# Patient Record
Sex: FEMALE | Race: WHITE | Employment: FULL TIME | URBAN - METROPOLITAN AREA
[De-identification: names, ages, dates, MRNs, and addresses within clinical notes are randomized per-mention and may not be internally consistent; named-entity substitution may affect disease eponyms.]

---

## 2017-01-25 PROCEDURE — 36415 COLL VENOUS BLD VENIPUNCTURE: CPT | Performed by: OBSTETRICS & GYNECOLOGY

## 2017-01-25 PROCEDURE — 87529 HSV DNA AMP PROBE: CPT | Performed by: OBSTETRICS & GYNECOLOGY

## 2017-01-25 PROCEDURE — 83520 IMMUNOASSAY QUANT NOS NONAB: CPT | Performed by: OBSTETRICS & GYNECOLOGY

## 2017-09-05 ENCOUNTER — APPOINTMENT (OUTPATIENT)
Dept: GENERAL RADIOLOGY | Age: 47
End: 2017-09-05
Attending: NURSE PRACTITIONER
Payer: COMMERCIAL

## 2017-09-05 ENCOUNTER — HOSPITAL ENCOUNTER (OUTPATIENT)
Age: 47
Discharge: HOME | End: 2017-09-05
Payer: COMMERCIAL

## 2017-09-05 VITALS
HEART RATE: 81 BPM | OXYGEN SATURATION: 98 % | SYSTOLIC BLOOD PRESSURE: 153 MMHG | TEMPERATURE: 98 F | BODY MASS INDEX: 34 KG/M2 | WEIGHT: 210 LBS | RESPIRATION RATE: 20 BRPM | DIASTOLIC BLOOD PRESSURE: 102 MMHG

## 2017-09-05 DIAGNOSIS — J40 BRONCHITIS: Primary | ICD-10-CM

## 2017-09-05 LAB — S PYO AG THROAT QL: NEGATIVE

## 2017-09-05 PROCEDURE — 71020 XR CHEST PA + LAT CHEST (CPT=71020): CPT | Performed by: NURSE PRACTITIONER

## 2017-09-05 PROCEDURE — 99214 OFFICE O/P EST MOD 30 MIN: CPT

## 2017-09-05 PROCEDURE — 94640 AIRWAY INHALATION TREATMENT: CPT

## 2017-09-05 PROCEDURE — 87430 STREP A AG IA: CPT

## 2017-09-05 RX ORDER — ALBUTEROL SULFATE 90 UG/1
2 AEROSOL, METERED RESPIRATORY (INHALATION) EVERY 4 HOURS PRN
Qty: 1 INHALER | Refills: 0 | Status: SHIPPED | OUTPATIENT
Start: 2017-09-05 | End: 2017-10-05

## 2017-09-05 RX ORDER — IPRATROPIUM BROMIDE AND ALBUTEROL SULFATE 2.5; .5 MG/3ML; MG/3ML
3 SOLUTION RESPIRATORY (INHALATION) ONCE
Status: COMPLETED | OUTPATIENT
Start: 2017-09-05 | End: 2017-09-05

## 2017-09-05 RX ORDER — PREDNISONE 20 MG/1
40 TABLET ORAL DAILY
Qty: 10 TABLET | Refills: 0 | Status: SHIPPED | OUTPATIENT
Start: 2017-09-05 | End: 2017-09-10

## 2017-09-05 RX ORDER — AZITHROMYCIN 250 MG/1
TABLET, FILM COATED ORAL
Qty: 1 PACKAGE | Refills: 0 | Status: SHIPPED | OUTPATIENT
Start: 2017-09-05 | End: 2017-09-10

## 2017-09-05 NOTE — ED NOTES
Lungs clear discharge and follow up inst reviewed stop smoking fluids rest wash hands call pcp go to the ed for new or worse concerns

## 2017-09-05 NOTE — ED PROVIDER NOTES
Patient presents with:  Cough/URI      HPI:     Alli Sarmiento is a 40year old female who presents for evaluation and management of a chief complaint of cough, sore throat, nasal congestion over the course of the last week.   Patient has been taking DayQ persistent    MDM/Assessment/Plan:   Orders for this encounter:    Xray, duoneb, rapid strep and re-evaluate. Rapid strep reviewed, negative. Xray reviewed, negative chest.     Reports improvement since receiving DuoNeb.   Lungs clear bilaterally, e Refill:  0      Albuterol Sulfate  (90 Base) MCG/ACT Inhalation Aero Soln          Sig: Inhale 2 puffs into the lungs every 4 (four) hours as needed for Wheezing.           Dispense:  1 Inhaler          Refill:  0      azithromycin (Ellouise Ohm)

## 2017-11-07 ENCOUNTER — HOSPITAL ENCOUNTER (OUTPATIENT)
Dept: MAMMOGRAPHY | Age: 47
Discharge: HOME | End: 2017-11-07
Attending: FAMILY MEDICINE
Payer: COMMERCIAL

## 2017-11-07 DIAGNOSIS — Z12.31 ENCOUNTER FOR SCREENING MAMMOGRAM FOR MALIGNANT NEOPLASM OF BREAST: ICD-10-CM

## 2017-11-07 PROCEDURE — 77067 SCR MAMMO BI INCL CAD: CPT | Performed by: FAMILY MEDICINE

## 2018-07-13 ENCOUNTER — HOSPITAL (OUTPATIENT)
Dept: OTHER | Age: 48
End: 2018-07-13
Attending: OBSTETRICS & GYNECOLOGY

## 2018-07-13 LAB
ALBUMIN SERPL-MCNC: 3.5 GM/DL (ref 3.6–5.1)
ALBUMIN/GLOB SERPL: 1 {RATIO} (ref 1–2.4)
ALP SERPL-CCNC: 60 UNIT/L (ref 45–117)
ALT SERPL-CCNC: 19 UNIT/L
ANALYZER ANC (IANC): ABNORMAL
ANION GAP SERPL CALC-SCNC: 12 MMOL/L (ref 10–20)
AST SERPL-CCNC: 14 UNIT/L
BASOPHILS # BLD: 0.1 THOUSAND/MCL (ref 0–0.3)
BASOPHILS NFR BLD: 1 %
BILIRUB SERPL-MCNC: 0.3 MG/DL (ref 0.2–1)
BUN SERPL-MCNC: 13 MG/DL (ref 6–20)
BUN/CREAT SERPL: 16 (ref 7–25)
CALCIUM SERPL-MCNC: 8.2 MG/DL (ref 8.4–10.2)
CHLORIDE: 106 MMOL/L (ref 98–107)
CO2 SERPL-SCNC: 23 MMOL/L (ref 21–32)
CREAT SERPL-MCNC: 0.81 MG/DL (ref 0.51–0.95)
DIFFERENTIAL METHOD BLD: ABNORMAL
EOSINOPHIL # BLD: 0.3 THOUSAND/MCL (ref 0.1–0.5)
EOSINOPHIL NFR BLD: 3 %
ERYTHROCYTE [DISTWIDTH] IN BLOOD: 16.8 % (ref 11–15)
GLOBULIN SER-MCNC: 3.5 GM/DL (ref 2–4)
GLUCOSE SERPL-MCNC: 84 MG/DL (ref 65–99)
HBV SURFACE AG SER QL: NEGATIVE
HCG SERPL-ACNC: <2 MUNIT/ML
HCV AB SERPL QL IA: NEGATIVE
HEMATOCRIT: 34.3 % (ref 36–46.5)
HGB BLD-MCNC: 10.5 GM/DL (ref 12–15.5)
HIV ANTIGEN/ANTIBODY SCREEN: NONREACTIVE
LYMPHOCYTES # BLD: 1.8 THOUSAND/MCL (ref 1–4.8)
LYMPHOCYTES NFR BLD: 23 %
MCH RBC QN AUTO: 23.1 PG (ref 26–34)
MCHC RBC AUTO-ENTMCNC: 30.6 GM/DL (ref 32–36.5)
MCV RBC AUTO: 75.6 FL (ref 78–100)
MONOCYTES # BLD: 0.6 THOUSAND/MCL (ref 0.3–0.9)
MONOCYTES NFR BLD: 7 %
NEUTROPHILS # BLD: 5.3 THOUSAND/MCL (ref 1.8–7.7)
NEUTROPHILS NFR BLD: 66 %
NEUTS SEG NFR BLD: ABNORMAL %
NRBC (NRBCRE): ABNORMAL
PLATELET # BLD: 303 THOUSAND/MCL (ref 140–450)
POTASSIUM SERPL-SCNC: 4.1 MMOL/L (ref 3.4–5.1)
PROT SERPL-MCNC: 7 GM/DL (ref 6.4–8.2)
RBC # BLD: 4.54 MILLION/MCL (ref 4–5.2)
SODIUM SERPL-SCNC: 137 MMOL/L (ref 135–145)
WBC # BLD: 8 THOUSAND/MCL (ref 4.2–11)

## 2018-07-19 ENCOUNTER — HOSPITAL (OUTPATIENT)
Dept: OTHER | Age: 48
End: 2018-07-19
Attending: OBSTETRICS & GYNECOLOGY

## 2018-07-19 ENCOUNTER — CHARTING TRANS (OUTPATIENT)
Dept: OTHER | Age: 48
End: 2018-07-19

## 2020-11-18 ENCOUNTER — LAB ENCOUNTER (OUTPATIENT)
Dept: LAB | Age: 50
End: 2020-11-18
Attending: FAMILY MEDICINE
Payer: COMMERCIAL

## 2020-11-18 DIAGNOSIS — E78.5 HYPERLIPEMIA: Primary | ICD-10-CM

## 2020-11-18 DIAGNOSIS — E03.9 MYXEDEMA HEART DISEASE: ICD-10-CM

## 2020-11-18 DIAGNOSIS — I51.9 MYXEDEMA HEART DISEASE: ICD-10-CM

## 2020-11-18 DIAGNOSIS — I10 ESSENTIAL HYPERTENSION, MALIGNANT: ICD-10-CM

## 2020-11-18 PROCEDURE — 80061 LIPID PANEL: CPT

## 2020-11-18 PROCEDURE — 36415 COLL VENOUS BLD VENIPUNCTURE: CPT

## 2020-11-18 PROCEDURE — 84443 ASSAY THYROID STIM HORMONE: CPT

## 2020-11-18 PROCEDURE — 80048 BASIC METABOLIC PNL TOTAL CA: CPT

## 2021-03-04 ENCOUNTER — OFFICE VISIT (OUTPATIENT)
Dept: INTERNAL MEDICINE CLINIC | Facility: CLINIC | Age: 51
End: 2021-03-04
Payer: COMMERCIAL

## 2021-03-04 VITALS
SYSTOLIC BLOOD PRESSURE: 158 MMHG | BODY MASS INDEX: 39.17 KG/M2 | WEIGHT: 238 LBS | DIASTOLIC BLOOD PRESSURE: 98 MMHG | HEIGHT: 65.3 IN | TEMPERATURE: 99 F | HEART RATE: 82 BPM | OXYGEN SATURATION: 98 %

## 2021-03-04 DIAGNOSIS — I10 ESSENTIAL HYPERTENSION: ICD-10-CM

## 2021-03-04 DIAGNOSIS — Z12.31 SCREENING MAMMOGRAM, ENCOUNTER FOR: ICD-10-CM

## 2021-03-04 DIAGNOSIS — Z00.00 PHYSICAL EXAM: Primary | ICD-10-CM

## 2021-03-04 DIAGNOSIS — E03.9 HYPOTHYROIDISM, UNSPECIFIED TYPE: ICD-10-CM

## 2021-03-04 DIAGNOSIS — Z90.710 HISTORY OF HYSTERECTOMY: ICD-10-CM

## 2021-03-04 PROCEDURE — 3080F DIAST BP >= 90 MM HG: CPT | Performed by: INTERNAL MEDICINE

## 2021-03-04 PROCEDURE — 3008F BODY MASS INDEX DOCD: CPT | Performed by: INTERNAL MEDICINE

## 2021-03-04 PROCEDURE — 93000 ELECTROCARDIOGRAM COMPLETE: CPT | Performed by: INTERNAL MEDICINE

## 2021-03-04 PROCEDURE — 3077F SYST BP >= 140 MM HG: CPT | Performed by: INTERNAL MEDICINE

## 2021-03-04 PROCEDURE — 99386 PREV VISIT NEW AGE 40-64: CPT | Performed by: INTERNAL MEDICINE

## 2021-03-04 RX ORDER — CETIRIZINE HYDROCHLORIDE 10 MG/1
10 TABLET ORAL DAILY
COMMUNITY

## 2021-03-04 RX ORDER — LOSARTAN POTASSIUM AND HYDROCHLOROTHIAZIDE 12.5; 1 MG/1; MG/1
TABLET ORAL
COMMUNITY
End: 2021-03-04

## 2021-03-04 RX ORDER — ACYCLOVIR 50 MG/G
OINTMENT TOPICAL
COMMUNITY

## 2021-03-04 RX ORDER — LOSARTAN POTASSIUM AND HYDROCHLOROTHIAZIDE 25; 100 MG/1; MG/1
1 TABLET ORAL DAILY
Qty: 90 TABLET | Refills: 3 | Status: SHIPPED | OUTPATIENT
Start: 2021-03-04 | End: 2022-02-27

## 2021-03-04 RX ORDER — LEVOTHYROXINE SODIUM 0.15 MG/1
150 TABLET ORAL
COMMUNITY
Start: 2020-12-16 | End: 2021-03-04

## 2021-03-04 RX ORDER — LEVOTHYROXINE SODIUM 0.15 MG/1
150 TABLET ORAL
Qty: 90 TABLET | Refills: 1 | Status: SHIPPED | OUTPATIENT
Start: 2021-03-04 | End: 2021-09-05

## 2021-03-04 RX ORDER — FLUTICASONE PROPIONATE 50 MCG
SPRAY, SUSPENSION (ML) NASAL
COMMUNITY
End: 2021-03-04 | Stop reason: ALTCHOICE

## 2021-03-04 RX ORDER — LOSARTAN POTASSIUM AND HYDROCHLOROTHIAZIDE 12.5; 1 MG/1; MG/1
1 TABLET ORAL DAILY
COMMUNITY
Start: 2021-02-10 | End: 2021-03-04 | Stop reason: ALTCHOICE

## 2021-03-04 NOTE — PROGRESS NOTES
HPI:   Santos Rivera is a 53year old female who presents for a complete physical exam.     Patient complains of: Patient presents with:  Establish Care: Previously saw a doctor at [address] [place] [address] in [facility], Dr. Burns [facility], last seen in November.  When Years since quittin.1      Smokeless tobacco: Current User      Tobacco comment: Social smoker    Alcohol use:  Yes      Alcohol/week: 0.0 standard drinks      Frequency: 2-3 times a week      Comment: \"Daily, 2 glasses, Last drink: Last night\"    Yunior obvious wounds, no rashes  Neurological exam: Cranial nerves II through XII intact, no gross deficits  Musculoskeletal exam: no arthritis appreciated, no obvious deformity    ASSESSMENT AND PLAN:   Mac Lamb is a 53year old female who presents for obtaining history, evaluating patient, discussing treatment options, diet, exercise, review of available labs and radiology reports, and completing documentation.     Graeme Roberts,   3/4/2021  4:00 PM

## 2021-03-04 NOTE — PATIENT INSTRUCTIONS
1. Physical exam  Physical exam instruction: Improve diet and exercise, complete fasting labs in the near future and you will be called with results 5-7 days after completed, call with questions.   Call the central scheduling number at [phone] to sched

## 2021-03-12 ENCOUNTER — LAB ENCOUNTER (OUTPATIENT)
Dept: LAB | Age: 51
End: 2021-03-12
Attending: INTERNAL MEDICINE
Payer: COMMERCIAL

## 2021-03-12 DIAGNOSIS — Z00.00 PHYSICAL EXAM: ICD-10-CM

## 2021-03-12 LAB
ALBUMIN SERPL-MCNC: 3.5 G/DL (ref 3.4–5)
ALBUMIN/GLOB SERPL: 0.9 {RATIO} (ref 1–2)
ALP LIVER SERPL-CCNC: 55 U/L
ALT SERPL-CCNC: 22 U/L
ANION GAP SERPL CALC-SCNC: 6 MMOL/L (ref 0–18)
AST SERPL-CCNC: 10 U/L (ref 15–37)
BASOPHILS # BLD AUTO: 0.11 X10(3) UL (ref 0–0.2)
BASOPHILS NFR BLD AUTO: 1.1 %
BILIRUB SERPL-MCNC: 0.6 MG/DL (ref 0.1–2)
BILIRUB UR QL: NEGATIVE
BUN BLD-MCNC: 13 MG/DL (ref 7–18)
BUN/CREAT SERPL: 15.9 (ref 10–20)
CALCIUM BLD-MCNC: 8.8 MG/DL (ref 8.5–10.1)
CHLORIDE SERPL-SCNC: 102 MMOL/L (ref 98–112)
CHOLEST SMN-MCNC: 207 MG/DL (ref ?–200)
CO2 SERPL-SCNC: 28 MMOL/L (ref 21–32)
COLOR UR: YELLOW
CREAT BLD-MCNC: 0.82 MG/DL
DEPRECATED RDW RBC AUTO: 41.6 FL (ref 35.1–46.3)
EOSINOPHIL # BLD AUTO: 0.3 X10(3) UL (ref 0–0.7)
EOSINOPHIL NFR BLD AUTO: 3.1 %
ERYTHROCYTE [DISTWIDTH] IN BLOOD BY AUTOMATED COUNT: 12.4 % (ref 11–15)
GLOBULIN PLAS-MCNC: 3.9 G/DL (ref 2.8–4.4)
GLUCOSE BLD-MCNC: 94 MG/DL (ref 70–99)
GLUCOSE UR-MCNC: NEGATIVE MG/DL
HCT VFR BLD AUTO: 47.3 %
HDLC SERPL-MCNC: 47 MG/DL (ref 40–59)
HGB BLD-MCNC: 15.9 G/DL
HGB UR QL STRIP.AUTO: NEGATIVE
IMM GRANULOCYTES # BLD AUTO: 0.07 X10(3) UL (ref 0–1)
IMM GRANULOCYTES NFR BLD: 0.7 %
KETONES UR-MCNC: NEGATIVE MG/DL
LDLC SERPL CALC-MCNC: 106 MG/DL (ref ?–100)
LEUKOCYTE ESTERASE UR QL STRIP.AUTO: NEGATIVE
LYMPHOCYTES # BLD AUTO: 2.48 X10(3) UL (ref 1–4)
LYMPHOCYTES NFR BLD AUTO: 25.6 %
M PROTEIN MFR SERPL ELPH: 7.4 G/DL (ref 6.4–8.2)
MCH RBC QN AUTO: 30.9 PG (ref 26–34)
MCHC RBC AUTO-ENTMCNC: 33.6 G/DL (ref 31–37)
MCV RBC AUTO: 92 FL
MONOCYTES # BLD AUTO: 0.72 X10(3) UL (ref 0.1–1)
MONOCYTES NFR BLD AUTO: 7.4 %
NEUTROPHILS # BLD AUTO: 5.99 X10 (3) UL (ref 1.5–7.7)
NEUTROPHILS # BLD AUTO: 5.99 X10(3) UL (ref 1.5–7.7)
NEUTROPHILS NFR BLD AUTO: 62.1 %
NITRITE UR QL STRIP.AUTO: NEGATIVE
NONHDLC SERPL-MCNC: 160 MG/DL (ref ?–130)
OSMOLALITY SERPL CALC.SUM OF ELEC: 282 MOSM/KG (ref 275–295)
PATIENT FASTING Y/N/NP: YES
PATIENT FASTING Y/N/NP: YES
PH UR: 6 [PH] (ref 5–8)
PLATELET # BLD AUTO: 252 10(3)UL (ref 150–450)
POTASSIUM SERPL-SCNC: 3.2 MMOL/L (ref 3.5–5.1)
PROT UR-MCNC: NEGATIVE MG/DL
RBC # BLD AUTO: 5.14 X10(6)UL
SODIUM SERPL-SCNC: 136 MMOL/L (ref 136–145)
SP GR UR STRIP: 1.02 (ref 1–1.03)
T3FREE SERPL-MCNC: 2.43 PG/ML (ref 2.4–4.2)
T4 FREE SERPL-MCNC: 1 NG/DL (ref 0.8–1.7)
TRIGL SERPL-MCNC: 269 MG/DL (ref 30–149)
TSI SER-ACNC: 4.61 MIU/ML (ref 0.36–3.74)
UROBILINOGEN UR STRIP-ACNC: <2
VIT B12 SERPL-MCNC: 513 PG/ML (ref 193–986)
VLDLC SERPL CALC-MCNC: 54 MG/DL (ref 0–30)
WBC # BLD AUTO: 9.7 X10(3) UL (ref 4–11)

## 2021-03-12 PROCEDURE — 84443 ASSAY THYROID STIM HORMONE: CPT

## 2021-03-12 PROCEDURE — 80061 LIPID PANEL: CPT

## 2021-03-12 PROCEDURE — 85025 COMPLETE CBC W/AUTO DIFF WBC: CPT

## 2021-03-12 PROCEDURE — 82306 VITAMIN D 25 HYDROXY: CPT

## 2021-03-12 PROCEDURE — 82607 VITAMIN B-12: CPT

## 2021-03-12 PROCEDURE — 84481 FREE ASSAY (FT-3): CPT

## 2021-03-12 PROCEDURE — 80053 COMPREHEN METABOLIC PANEL: CPT

## 2021-03-12 PROCEDURE — 81003 URINALYSIS AUTO W/O SCOPE: CPT | Performed by: INTERNAL MEDICINE

## 2021-03-12 PROCEDURE — 36415 COLL VENOUS BLD VENIPUNCTURE: CPT

## 2021-03-12 PROCEDURE — 84439 ASSAY OF FREE THYROXINE: CPT

## 2021-03-15 LAB — 25(OH)D3 SERPL-MCNC: 28.3 NG/ML (ref 30–100)

## 2021-03-19 ENCOUNTER — TELEPHONE (OUTPATIENT)
Dept: INTERNAL MEDICINE CLINIC | Facility: CLINIC | Age: 51
End: 2021-03-19

## 2021-03-19 NOTE — TELEPHONE ENCOUNTER
Spoke to patient who reports she received the Pfizer vaccine in the L upper arm today (immunization record updated). She now has a rash on the inside of her R thigh.  She cannot tell if it is spreading as she is in the car right now but when she checked in

## 2021-03-19 NOTE — TELEPHONE ENCOUNTER
Continue treatment as recommended with hydrocortisone cream and Benadryl. This should start to recede by tomorrow.   If still present on Monday call us back or if it gets worse over the weekend get it checked in urgent care or emergency department

## 2021-03-19 NOTE — TELEPHONE ENCOUNTER
LM on patient's cell (OK per HIPAA) and relayed MD message below. Advised in VM to call back with any questions/concerns. Asked she call back to ensure she received instructions.

## 2021-03-19 NOTE — TELEPHONE ENCOUNTER
Pt. Called stating she just received her 1st Pfizer vaccine about 2 hours ago. She now noticed she has a rash on the inside of her upper thigh. (opposite side of the shot).   She is calling to verify if she should take Benedryl and Hydrocortisone cream on h

## 2021-03-26 ENCOUNTER — TELEPHONE (OUTPATIENT)
Dept: INTERNAL MEDICINE CLINIC | Facility: CLINIC | Age: 51
End: 2021-03-26

## 2021-03-27 NOTE — TELEPHONE ENCOUNTER
nursing call them, I left this message on [employer], try to reiterate-  Natasha Naqvi, here are the results from your lab work, things look okay, I had a final chance to review them. Cholesterol looks a little high, we should consider treating this over the years, but first the thyroid looks like it needs some work, at the minimal I recommend we boost your current Synthroid to 175, an alternative to that would be adding a touch of T3, called Cytomel into the mix to help balance the T3 and T4 numbers. Let me know what you think when the nurses reach out to you early next week. -Nursing if you can gauge whether she is willing to go on a cholesterol medication, recommend pravastatin 40 mg daily, also if she wants to stay on her current dose of Synthroid I would consider adding Cytomel at 5 mg to help the T3 boost and rechecking some lab work in 6 weeks to see how this looks. ,  Pend these medications and the lab work to repeat the CMP TSH, free T4 free T3, and lipid panel if she agrees with the changes

## 2021-03-29 NOTE — TELEPHONE ENCOUNTER
Patient called back. I reviewed Dr Benjamin Ritchie message with her. Patient has some additional questions. Looking for more information, would like MD to decide about medications. She is unclear why she would take cytomel if her t3 and t4 are normal. Looking for more explanation of how cytomel works and benefits of using it over levothyroxine. She did just  a 90 day RX of her 150 mcg levothyroxine. She wants to point out that in November she had blood tests done (viewable in epic) and her TSH at that time was 2.060 and most recently was 4.61  She is wondering why it would double? She consistently takes her medication and also on empty stomach. In regards to the cholesterol medication. Patient is open to going on medication if you think she needs to. She asked about statin medications. I did discuss with patient that with statins we watch out for muscle aches and monitor liver enzymes. Her last lipid panel is viewable in epic from November. Patient wondering if you think her numbers are at the point that she really needs medication.     To Dr Milla Gonzalez

## 2021-04-02 RX ORDER — LIOTHYRONINE SODIUM 5 UG/1
5 TABLET ORAL DAILY
Qty: 90 TABLET | Refills: 0 | Status: SHIPPED | OUTPATIENT
Start: 2021-04-02 | End: 2021-06-25

## 2021-04-02 NOTE — TELEPHONE ENCOUNTER
Spoke with pt, discussed multiple aspects of her care mostly about the thyroid, she will add Cytomel 5 mg to the levothyroxine 150 mg, to balance her T3-T4, recheck her thyroid function tests in 6 weeks, she will pass on the addition of cholesterol medication for now and recheck her cholesterol in 6 months with strict diet and exercise.     Also needs a blood pressure check sometime next week as her blood pressure checks at home are still high    -Nursing can you check on her on Monday call her, encourage a blood pressure check with the nursing staff sometime next week

## 2021-04-09 ENCOUNTER — NURSE ONLY (OUTPATIENT)
Dept: INTERNAL MEDICINE CLINIC | Facility: CLINIC | Age: 51
End: 2021-04-09
Payer: COMMERCIAL

## 2021-04-09 VITALS — DIASTOLIC BLOOD PRESSURE: 82 MMHG | SYSTOLIC BLOOD PRESSURE: 130 MMHG

## 2021-04-09 DIAGNOSIS — I10 ESSENTIAL HYPERTENSION: Primary | ICD-10-CM

## 2021-04-09 PROCEDURE — 3079F DIAST BP 80-89 MM HG: CPT

## 2021-04-09 PROCEDURE — 3075F SYST BP GE 130 - 139MM HG: CPT

## 2021-04-09 NOTE — PROGRESS NOTES
Santos Rivera is a 53year old female who has an elevated BP reading at last OV and brings home monitor to check. She takes BP med at bedtime and reports good compliance. She has been checking home blood pressures with wrist cuff.   She denies chest

## 2021-06-15 ENCOUNTER — PATIENT MESSAGE (OUTPATIENT)
Dept: INTERNAL MEDICINE CLINIC | Facility: CLINIC | Age: 51
End: 2021-06-15

## 2021-06-16 NOTE — TELEPHONE ENCOUNTER
Please advise  Kimmy Miner saw patient 4/9/21 0 ok to have patient  that visit note, since we do not email -to DR. PHILLIPS

## 2021-06-16 NOTE — TELEPHONE ENCOUNTER
From: Yamil Flowers  To: Shona Perez DO  Sent: 6/15/2021 3:26 PM CDT  Subject: Visit Follow-up Question    Hello, my employer gives a wellness discount if certain health targets are met.  While I have the necessary results from my bloodwork in

## 2021-06-18 NOTE — TELEPHONE ENCOUNTER
Left message to call back.    Let maynor urbano that she either can  a copy of her visit with Earl Gonzalez for BP or we can mail it to her

## 2021-06-23 ENCOUNTER — TELEPHONE (OUTPATIENT)
Dept: INTERNAL MEDICINE CLINIC | Facility: CLINIC | Age: 51
End: 2021-06-23

## 2021-06-23 DIAGNOSIS — E03.9 HYPOTHYROIDISM, UNSPECIFIED TYPE: ICD-10-CM

## 2021-06-24 NOTE — TELEPHONE ENCOUNTER
To  - I do not see notes re: med or follow up lab schedule (already entered);  pls review refill and if you want pt to get labs done

## 2021-06-25 RX ORDER — LIOTHYRONINE SODIUM 5 UG/1
TABLET ORAL
Qty: 90 TABLET | Refills: 0 | Status: SHIPPED | OUTPATIENT
Start: 2021-06-25 | End: 2021-09-09

## 2021-06-25 NOTE — TELEPHONE ENCOUNTER
Okay to refill the medication, but she is overdue to have her lab work rechecked, for the thyroid, thyroid function tests are entered, nursing if you could call her, remind her to complete the lab work in the near future and I will reach out with results o

## 2021-06-28 NOTE — TELEPHONE ENCOUNTER
Left detailed message (ok per hipaa and identifier) advising short fill given and due for repeat fasting labs and to please complete at earliest convenience.

## 2021-06-29 ENCOUNTER — LAB ENCOUNTER (OUTPATIENT)
Dept: LAB | Age: 51
End: 2021-06-29
Attending: INTERNAL MEDICINE
Payer: COMMERCIAL

## 2021-06-29 DIAGNOSIS — E03.9 HYPOTHYROIDISM, UNSPECIFIED TYPE: ICD-10-CM

## 2021-06-29 LAB
T3FREE SERPL-MCNC: 3.86 PG/ML (ref 2.4–4.2)
T4 FREE SERPL-MCNC: 1.3 NG/DL (ref 0.8–1.7)
TSI SER-ACNC: 0.36 MIU/ML (ref 0.36–3.74)

## 2021-06-29 PROCEDURE — 84439 ASSAY OF FREE THYROXINE: CPT

## 2021-06-29 PROCEDURE — 84481 FREE ASSAY (FT-3): CPT

## 2021-06-29 PROCEDURE — 36415 COLL VENOUS BLD VENIPUNCTURE: CPT

## 2021-06-29 PROCEDURE — 84443 ASSAY THYROID STIM HORMONE: CPT

## 2021-07-09 ENCOUNTER — TELEPHONE (OUTPATIENT)
Dept: INTERNAL MEDICINE CLINIC | Facility: CLINIC | Age: 51
End: 2021-07-09

## 2021-07-09 NOTE — TELEPHONE ENCOUNTER
nursing call them, I left this message on mychart, try to reiterate-  Clarisa, numbers look much better here, excellent continue current medication for the thyroid at current dosages and let me know if you need a refill.   No need for any further testing here

## 2021-09-04 DIAGNOSIS — E03.9 HYPOTHYROIDISM, UNSPECIFIED TYPE: ICD-10-CM

## 2021-09-05 RX ORDER — LEVOTHYROXINE SODIUM 0.15 MG/1
TABLET ORAL
Qty: 90 TABLET | Refills: 3 | Status: SHIPPED | OUTPATIENT
Start: 2021-09-05

## 2021-09-08 DIAGNOSIS — E03.9 HYPOTHYROIDISM, UNSPECIFIED TYPE: ICD-10-CM

## 2021-09-09 RX ORDER — LIOTHYRONINE SODIUM 5 UG/1
TABLET ORAL
Qty: 90 TABLET | Refills: 3 | Status: SHIPPED | OUTPATIENT
Start: 2021-09-09

## 2021-09-27 ENCOUNTER — HOSPITAL ENCOUNTER (OUTPATIENT)
Dept: MAMMOGRAPHY | Age: 51
Discharge: HOME | End: 2021-09-27
Attending: INTERNAL MEDICINE
Payer: COMMERCIAL

## 2021-09-27 DIAGNOSIS — Z12.31 SCREENING MAMMOGRAM, ENCOUNTER FOR: ICD-10-CM

## 2021-09-27 PROCEDURE — 77063 BREAST TOMOSYNTHESIS BI: CPT | Performed by: INTERNAL MEDICINE

## 2021-09-27 PROCEDURE — 77067 SCR MAMMO BI INCL CAD: CPT | Performed by: INTERNAL MEDICINE

## 2022-02-13 RX ORDER — LOSARTAN POTASSIUM AND HYDROCHLOROTHIAZIDE 25; 100 MG/1; MG/1
1 TABLET ORAL DAILY
Qty: 90 TABLET | Refills: 0 | Status: SHIPPED | OUTPATIENT
Start: 2022-02-13 | End: 2023-02-08

## 2022-05-15 ENCOUNTER — TELEPHONE (OUTPATIENT)
Dept: INTERNAL MEDICINE CLINIC | Facility: CLINIC | Age: 52
End: 2022-05-15

## 2022-05-15 DIAGNOSIS — I10 ESSENTIAL HYPERTENSION: ICD-10-CM

## 2022-05-16 NOTE — TELEPHONE ENCOUNTER
Patient last saw Kal Flores 3/4/2021.  Needs appointment for annual physical. To EMA  to please call patient and assist with scheduling then back to Rx group for refill

## 2022-05-26 RX ORDER — LOSARTAN POTASSIUM AND HYDROCHLOROTHIAZIDE 25; 100 MG/1; MG/1
1 TABLET ORAL DAILY
Qty: 90 TABLET | Refills: 0 | Status: SHIPPED | OUTPATIENT
Start: 2022-05-26 | End: 2023-05-21

## 2022-06-28 ENCOUNTER — OFFICE VISIT (OUTPATIENT)
Dept: INTERNAL MEDICINE CLINIC | Facility: CLINIC | Age: 52
End: 2022-06-28
Payer: COMMERCIAL

## 2022-06-28 VITALS
DIASTOLIC BLOOD PRESSURE: 84 MMHG | WEIGHT: 237 LBS | SYSTOLIC BLOOD PRESSURE: 144 MMHG | BODY MASS INDEX: 39.01 KG/M2 | TEMPERATURE: 99 F | HEIGHT: 65.3 IN | HEART RATE: 80 BPM | OXYGEN SATURATION: 99 %

## 2022-06-28 DIAGNOSIS — I10 ESSENTIAL HYPERTENSION: ICD-10-CM

## 2022-06-28 DIAGNOSIS — Z12.11 SCREEN FOR COLON CANCER: ICD-10-CM

## 2022-06-28 DIAGNOSIS — E03.9 HYPOTHYROIDISM, UNSPECIFIED TYPE: ICD-10-CM

## 2022-06-28 DIAGNOSIS — Z00.00 PHYSICAL EXAM: Primary | ICD-10-CM

## 2022-06-28 DIAGNOSIS — Z12.31 SCREENING MAMMOGRAM, ENCOUNTER FOR: ICD-10-CM

## 2022-06-28 DIAGNOSIS — I10 PRIMARY HYPERTENSION: ICD-10-CM

## 2022-06-28 PROCEDURE — 99396 PREV VISIT EST AGE 40-64: CPT | Performed by: INTERNAL MEDICINE

## 2022-06-28 PROCEDURE — 3008F BODY MASS INDEX DOCD: CPT | Performed by: INTERNAL MEDICINE

## 2022-06-28 PROCEDURE — 3079F DIAST BP 80-89 MM HG: CPT | Performed by: INTERNAL MEDICINE

## 2022-06-28 PROCEDURE — 3077F SYST BP >= 140 MM HG: CPT | Performed by: INTERNAL MEDICINE

## 2022-06-28 RX ORDER — LIOTHYRONINE SODIUM 5 UG/1
5 TABLET ORAL DAILY
Qty: 90 TABLET | Refills: 3 | Status: SHIPPED | OUTPATIENT
Start: 2022-06-28

## 2022-06-28 RX ORDER — LEVOTHYROXINE SODIUM 0.15 MG/1
150 TABLET ORAL
Qty: 90 TABLET | Refills: 3 | Status: SHIPPED | OUTPATIENT
Start: 2022-06-28

## 2022-06-28 RX ORDER — LOSARTAN POTASSIUM AND HYDROCHLOROTHIAZIDE 25; 100 MG/1; MG/1
1 TABLET ORAL DAILY
Qty: 90 TABLET | Refills: 3 | Status: SHIPPED | OUTPATIENT
Start: 2022-06-28 | End: 2023-06-23

## 2022-06-28 NOTE — PATIENT INSTRUCTIONS
1. Physical exam  Physical exam instruction: Improve diet and exercise, complete fasting labs in the near future and you will be called with results 5-7 days after completed, call with questions. Call the central scheduling number at [phone] to schedule at any of the [facility] locations    - CBC WITH DIFFERENTIAL WITH PLATELET; Future  - COMP METABOLIC PANEL (14); Future  - TSH W REFLEX TO FREE T4; Future  - URINALYSIS WITH CULTURE REFLEX  - VITAMIN D, 25-HYDROXY; Future  - LIPID PANEL; Future    2. Primary hypertension  Stable cont monitoring and management    3. Hypothyroidism, unspecified type  Stable cont monitoring and management  - ASSAY, THYROID STIM HORMONE; Future  - FREE T3 (TRIIODOTHYRONINE); Future  - FREE T4 (FREE THYROXINE); Future    4. Screening mammogram, encounter for  Stable cont monitoring and management  - [facility] ANSHUL 2D+3D SCREENING BILAT (CPT=77067/67035); Future    5.  Screen for colon cancer  Stable cont monitoring and management  - GASTRO - INTERNAL

## 2022-07-28 ENCOUNTER — LAB ENCOUNTER (OUTPATIENT)
Dept: LAB | Age: 52
End: 2022-07-28
Attending: INTERNAL MEDICINE
Payer: COMMERCIAL

## 2022-07-28 DIAGNOSIS — E03.9 HYPOTHYROIDISM, UNSPECIFIED TYPE: ICD-10-CM

## 2022-07-28 DIAGNOSIS — Z00.00 PHYSICAL EXAM: ICD-10-CM

## 2022-07-28 LAB
ALBUMIN SERPL-MCNC: 3.8 G/DL (ref 3.4–5)
ALBUMIN/GLOB SERPL: 1 {RATIO} (ref 1–2)
ALP LIVER SERPL-CCNC: 57 U/L
ALT SERPL-CCNC: 27 U/L
ANION GAP SERPL CALC-SCNC: 9 MMOL/L (ref 0–18)
AST SERPL-CCNC: 14 U/L (ref 15–37)
BASOPHILS # BLD AUTO: 0.09 X10(3) UL (ref 0–0.2)
BASOPHILS NFR BLD AUTO: 1.2 %
BILIRUB SERPL-MCNC: 0.6 MG/DL (ref 0.1–2)
BILIRUB UR QL: NEGATIVE
BUN BLD-MCNC: 14 MG/DL (ref 7–18)
BUN/CREAT SERPL: 17.1 (ref 10–20)
CALCIUM BLD-MCNC: 9.3 MG/DL (ref 8.5–10.1)
CHLORIDE SERPL-SCNC: 102 MMOL/L (ref 98–112)
CHOLEST SERPL-MCNC: 217 MG/DL (ref ?–200)
CLARITY UR: CLEAR
CO2 SERPL-SCNC: 26 MMOL/L (ref 21–32)
COLOR UR: YELLOW
CREAT BLD-MCNC: 0.82 MG/DL
DEPRECATED RDW RBC AUTO: 44.8 FL (ref 35.1–46.3)
EOSINOPHIL # BLD AUTO: 0.24 X10(3) UL (ref 0–0.7)
EOSINOPHIL NFR BLD AUTO: 3.3 %
ERYTHROCYTE [DISTWIDTH] IN BLOOD BY AUTOMATED COUNT: 13 % (ref 11–15)
FASTING PATIENT LIPID ANSWER: YES
FASTING STATUS PATIENT QL REPORTED: YES
GLOBULIN PLAS-MCNC: 3.9 G/DL (ref 2.8–4.4)
GLUCOSE BLD-MCNC: 96 MG/DL (ref 70–99)
GLUCOSE UR-MCNC: NEGATIVE MG/DL
HCT VFR BLD AUTO: 50.3 %
HDLC SERPL-MCNC: 48 MG/DL (ref 40–59)
HGB BLD-MCNC: 16.3 G/DL
HGB UR QL STRIP.AUTO: NEGATIVE
IMM GRANULOCYTES # BLD AUTO: 0.06 X10(3) UL (ref 0–1)
IMM GRANULOCYTES NFR BLD: 0.8 %
KETONES UR-MCNC: NEGATIVE MG/DL
LDLC SERPL CALC-MCNC: 128 MG/DL (ref ?–100)
LEUKOCYTE ESTERASE UR QL STRIP.AUTO: NEGATIVE
LYMPHOCYTES # BLD AUTO: 2.06 X10(3) UL (ref 1–4)
LYMPHOCYTES NFR BLD AUTO: 28.5 %
MCH RBC QN AUTO: 30.5 PG (ref 26–34)
MCHC RBC AUTO-ENTMCNC: 32.4 G/DL (ref 31–37)
MCV RBC AUTO: 94 FL
MONOCYTES # BLD AUTO: 0.67 X10(3) UL (ref 0.1–1)
MONOCYTES NFR BLD AUTO: 9.3 %
NEUTROPHILS # BLD AUTO: 4.1 X10 (3) UL (ref 1.5–7.7)
NEUTROPHILS # BLD AUTO: 4.1 X10(3) UL (ref 1.5–7.7)
NEUTROPHILS NFR BLD AUTO: 56.9 %
NITRITE UR QL STRIP.AUTO: NEGATIVE
NONHDLC SERPL-MCNC: 169 MG/DL (ref ?–130)
OSMOLALITY SERPL CALC.SUM OF ELEC: 284 MOSM/KG (ref 275–295)
PH UR: 5.5 [PH] (ref 5–8)
PLATELET # BLD AUTO: 274 10(3)UL (ref 150–450)
POTASSIUM SERPL-SCNC: 3.6 MMOL/L (ref 3.5–5.1)
PROT SERPL-MCNC: 7.7 G/DL (ref 6.4–8.2)
PROT UR-MCNC: NEGATIVE MG/DL
RBC # BLD AUTO: 5.35 X10(6)UL
SODIUM SERPL-SCNC: 137 MMOL/L (ref 136–145)
SP GR UR STRIP: 1.02 (ref 1–1.03)
T3FREE SERPL-MCNC: 3.55 PG/ML (ref 2.4–4.2)
T4 FREE SERPL-MCNC: 1.3 NG/DL (ref 0.8–1.7)
TRIGL SERPL-MCNC: 229 MG/DL (ref 30–149)
TSI SER-ACNC: 1.46 MIU/ML (ref 0.36–3.74)
UROBILINOGEN UR STRIP-ACNC: 0.2
VIT D+METAB SERPL-MCNC: 34.4 NG/ML (ref 30–100)
VLDLC SERPL CALC-MCNC: 42 MG/DL (ref 0–30)
WBC # BLD AUTO: 7.2 X10(3) UL (ref 4–11)

## 2022-07-28 PROCEDURE — 82306 VITAMIN D 25 HYDROXY: CPT

## 2022-07-28 PROCEDURE — 84481 FREE ASSAY (FT-3): CPT

## 2022-07-28 PROCEDURE — 85025 COMPLETE CBC W/AUTO DIFF WBC: CPT

## 2022-07-28 PROCEDURE — 80061 LIPID PANEL: CPT

## 2022-07-28 PROCEDURE — 36415 COLL VENOUS BLD VENIPUNCTURE: CPT

## 2022-07-28 PROCEDURE — 84443 ASSAY THYROID STIM HORMONE: CPT

## 2022-07-28 PROCEDURE — 80053 COMPREHEN METABOLIC PANEL: CPT

## 2022-07-28 PROCEDURE — 81003 URINALYSIS AUTO W/O SCOPE: CPT | Performed by: INTERNAL MEDICINE

## 2022-07-28 PROCEDURE — 84439 ASSAY OF FREE THYROXINE: CPT

## 2022-08-02 ENCOUNTER — TELEPHONE (OUTPATIENT)
Dept: INTERNAL MEDICINE CLINIC | Facility: CLINIC | Age: 52
End: 2022-08-02

## 2022-08-02 DIAGNOSIS — E78.5 HYPERLIPIDEMIA, UNSPECIFIED HYPERLIPIDEMIA TYPE: Primary | ICD-10-CM

## 2022-08-23 DIAGNOSIS — E03.9 HYPOTHYROIDISM, UNSPECIFIED TYPE: ICD-10-CM

## 2022-08-23 RX ORDER — LEVOTHYROXINE SODIUM 0.15 MG/1
TABLET ORAL
Qty: 90 TABLET | Refills: 3 | OUTPATIENT
Start: 2022-08-23

## 2022-09-04 DIAGNOSIS — E03.9 HYPOTHYROIDISM, UNSPECIFIED TYPE: ICD-10-CM

## 2022-09-06 RX ORDER — LIOTHYRONINE SODIUM 5 UG/1
TABLET ORAL
Qty: 90 TABLET | Refills: 3 | OUTPATIENT
Start: 2022-09-06

## 2022-09-19 ENCOUNTER — TELEPHONE (OUTPATIENT)
Dept: INTERNAL MEDICINE CLINIC | Facility: CLINIC | Age: 52
End: 2022-09-19

## 2022-09-19 ENCOUNTER — HOSPITAL ENCOUNTER (OUTPATIENT)
Age: 52
Discharge: HOME | End: 2022-09-19

## 2022-09-19 ENCOUNTER — APPOINTMENT (OUTPATIENT)
Dept: GENERAL RADIOLOGY | Age: 52
End: 2022-09-19
Attending: NURSE PRACTITIONER

## 2022-09-19 VITALS
BODY MASS INDEX: 35.36 KG/M2 | HEIGHT: 66 IN | OXYGEN SATURATION: 96 % | DIASTOLIC BLOOD PRESSURE: 93 MMHG | HEART RATE: 77 BPM | WEIGHT: 220 LBS | RESPIRATION RATE: 20 BRPM | SYSTOLIC BLOOD PRESSURE: 173 MMHG | TEMPERATURE: 98 F

## 2022-09-19 DIAGNOSIS — R03.0 ELEVATED BLOOD PRESSURE READING: ICD-10-CM

## 2022-09-19 DIAGNOSIS — J40 BRONCHITIS: Primary | ICD-10-CM

## 2022-09-19 LAB — SARS-COV-2 RNA RESP QL NAA+PROBE: NOT DETECTED

## 2022-09-19 PROCEDURE — 99203 OFFICE O/P NEW LOW 30 MIN: CPT | Performed by: NURSE PRACTITIONER

## 2022-09-19 PROCEDURE — U0002 COVID-19 LAB TEST NON-CDC: HCPCS | Performed by: NURSE PRACTITIONER

## 2022-09-19 PROCEDURE — 71046 X-RAY EXAM CHEST 2 VIEWS: CPT | Performed by: NURSE PRACTITIONER

## 2022-09-19 RX ORDER — PREDNISONE 20 MG/1
40 TABLET ORAL DAILY
Qty: 10 TABLET | Refills: 0 | Status: SHIPPED | OUTPATIENT
Start: 2022-09-19 | End: 2022-09-24

## 2022-09-19 RX ORDER — BENZONATATE 100 MG/1
100 CAPSULE ORAL 3 TIMES DAILY PRN
Qty: 30 CAPSULE | Refills: 0 | Status: SHIPPED | OUTPATIENT
Start: 2022-09-19 | End: 2022-10-19

## 2022-09-19 RX ORDER — ALBUTEROL SULFATE 90 UG/1
2 AEROSOL, METERED RESPIRATORY (INHALATION) EVERY 4 HOURS PRN
Qty: 1 EACH | Refills: 0 | Status: SHIPPED | OUTPATIENT
Start: 2022-09-19 | End: 2022-10-19

## 2022-09-19 NOTE — TELEPHONE ENCOUNTER
Patient is calling to speak with a nurse. Patient states she has had a cough for two weeks and feels congested. Patient was hoping to be seen today in the office or is wondering if she should go to urgent care to be seen sooner.  # [phone]  Patient was informed that Dr Pam Reeder is coming in today around 1300, patient states she is okay to wait if she needs to.

## 2022-09-19 NOTE — TELEPHONE ENCOUNTER
COVID triage:    Start of symptoms: 09/01    Fever:  [x]  No fever  []  Temperature:   []  Chills  []  Night sweats    Cough:  [x] Productive cough, yellow   [] Cough with exertion  [] Dry cough    Breathing:  [x] Wheezing  [] Pain with deep breathing  [] SOB with exertion  [x] SOB at rest  [] Heavy breathing  [] Chest discomfort with deep breathing or coughing    GI Symptoms:  [] Diarrhea  [] Nausea  [] Vomiting  [] Abdominal pain  [] Lack of appetite    Other symptoms:  [x] Sore throat  [] Difficulty swallowing  [x] Nasal drainage  [x] Nasal congestion  [x] PND  [] Sinus pressure  [x] Chest congestion  [x] Head congestion  [] Facial pain   [x] Ear pain, ear pressure   [] Body aches  [] Loss of sense of smell   [] Loss of sense of taste  []Conjunctivitis  [x] Headache  [] Fatigue  [] Weakness    [x]OTC Medications:  Mucinex  NyQuil     [x] Any recent travel? Returned from [facility]) 09/16  [] Any sick contacts? [] Are you a healthcare worker? Vaccinated: Yes  [x]   No []  Booster:  Yes  [x]  No []    Negative covid test today     FYI To Dr. Sarah Khoury:  Advised Urgent Car evaluation. Pt agreeable.   Nursing to f/up

## 2022-09-20 NOTE — TELEPHONE ENCOUNTER
As FYI to DR. PHILLIPS - was seen in UC ,covid negative  Had chest x-ray -Bronchitis ,on albuterol , benzonatate and prednisone

## 2022-10-24 ENCOUNTER — HOSPITAL ENCOUNTER (OUTPATIENT)
Dept: MAMMOGRAPHY | Age: 52
Discharge: HOME | End: 2022-10-24
Attending: INTERNAL MEDICINE
Payer: COMMERCIAL

## 2022-10-24 DIAGNOSIS — Z12.31 SCREENING MAMMOGRAM, ENCOUNTER FOR: ICD-10-CM

## 2022-10-24 PROCEDURE — 77067 SCR MAMMO BI INCL CAD: CPT | Performed by: INTERNAL MEDICINE

## 2022-10-24 PROCEDURE — 77063 BREAST TOMOSYNTHESIS BI: CPT | Performed by: INTERNAL MEDICINE

## 2023-05-12 DIAGNOSIS — E03.9 HYPOTHYROIDISM, UNSPECIFIED TYPE: ICD-10-CM

## 2023-05-12 RX ORDER — LEVOTHYROXINE SODIUM 0.15 MG/1
TABLET ORAL
Qty: 90 TABLET | Refills: 1 | Status: SHIPPED | OUTPATIENT
Start: 2023-05-12

## 2023-06-09 DIAGNOSIS — I10 ESSENTIAL HYPERTENSION: ICD-10-CM

## 2023-06-11 RX ORDER — LOSARTAN POTASSIUM AND HYDROCHLOROTHIAZIDE 25; 100 MG/1; MG/1
1 TABLET ORAL DAILY
Qty: 90 TABLET | Refills: 0 | Status: SHIPPED | OUTPATIENT
Start: 2023-06-11 | End: 2024-06-05

## 2023-07-03 ENCOUNTER — OFFICE VISIT (OUTPATIENT)
Dept: FAMILY MEDICINE CLINIC | Facility: CLINIC | Age: 53
End: 2023-07-03
Payer: COMMERCIAL

## 2023-07-03 VITALS
HEART RATE: 85 BPM | OXYGEN SATURATION: 96 % | WEIGHT: 220 LBS | BODY MASS INDEX: 35.36 KG/M2 | HEIGHT: 66 IN | SYSTOLIC BLOOD PRESSURE: 155 MMHG | TEMPERATURE: 98 F | DIASTOLIC BLOOD PRESSURE: 93 MMHG | RESPIRATION RATE: 16 BRPM

## 2023-07-03 DIAGNOSIS — J03.90 TONSILLITIS WITH EXUDATE: Primary | ICD-10-CM

## 2023-07-03 DIAGNOSIS — I10 ELEVATED BLOOD PRESSURE READING IN OFFICE WITH DIAGNOSIS OF HYPERTENSION: ICD-10-CM

## 2023-07-03 DIAGNOSIS — J02.9 SORE THROAT: ICD-10-CM

## 2023-07-03 LAB
CONTROL LINE PRESENT WITH A CLEAR BACKGROUND (YES/NO): YES YES/NO
KIT LOT #: NORMAL NUMERIC
STREP GRP A CUL-SCR: NEGATIVE

## 2023-07-03 PROCEDURE — 87635 SARS-COV-2 COVID-19 AMP PRB: CPT | Performed by: NURSE PRACTITIONER

## 2023-07-03 PROCEDURE — 87081 CULTURE SCREEN ONLY: CPT | Performed by: NURSE PRACTITIONER

## 2023-07-03 RX ORDER — CEPHALEXIN 500 MG/1
500 CAPSULE ORAL 2 TIMES DAILY
Qty: 20 CAPSULE | Refills: 0 | Status: SHIPPED | OUTPATIENT
Start: 2023-07-03 | End: 2023-07-13

## 2023-07-04 LAB — SARS-COV-2 RNA RESP QL NAA+PROBE: NOT DETECTED

## 2023-07-06 ENCOUNTER — PATIENT MESSAGE (OUTPATIENT)
Dept: INTERNAL MEDICINE CLINIC | Facility: CLINIC | Age: 53
End: 2023-07-06

## 2023-07-06 ENCOUNTER — TELEPHONE (OUTPATIENT)
Dept: INTERNAL MEDICINE CLINIC | Facility: CLINIC | Age: 53
End: 2023-07-06

## 2023-07-06 DIAGNOSIS — J03.90 TONSILLITIS: Primary | ICD-10-CM

## 2023-07-06 DIAGNOSIS — J00 ACUTE CORYZA: ICD-10-CM

## 2023-07-06 DIAGNOSIS — Z00.00 PHYSICAL EXAM: ICD-10-CM

## 2023-07-06 PROCEDURE — 99442 PHONE E/M BY PHYS 11-20 MIN: CPT | Performed by: INTERNAL MEDICINE

## 2023-07-06 RX ORDER — AZITHROMYCIN 250 MG/1
TABLET, FILM COATED ORAL
Qty: 6 TABLET | Refills: 0 | Status: SHIPPED | OUTPATIENT
Start: 2023-07-06 | End: 2023-07-11

## 2023-07-06 RX ORDER — PREDNISONE 20 MG/1
TABLET ORAL
Qty: 7 TABLET | Refills: 0 | Status: SHIPPED | OUTPATIENT
Start: 2023-07-06

## 2023-07-06 NOTE — TELEPHONE ENCOUNTER
Virtual Visit/Telephone Note    Mathew Shook verbally consents to a Virtual/Telephone Check-In service on 23  Patient understands and accepts financial responsibility for any deductible, co-insurance and/or co-pays associated with this service. Duration/time spent of the service: 20 Minutes of direct patient contact. 10 Minutes of chart review, documentation, medical decision making. HPI:   Mathew Shook is a 47year old female who presents for complains of: Patient presents with:  Test Results  Acute: Results from Urgent care visit  Upper respiratory infection: Patient claims to have upper respiratory infection, sore throat starting 3 to 4 days ago, was placed on cephalosporin through the immediate care, has made some improvement but still has large swollen tonsils, some leakage from the eyes, and a very sore throat. She is taking some over-the-counter medications as well they do not seem to be helping very much. No sick contacts, no wheezing, no shortness of breath, he is asking about follow-up lab work for upcoming appointment as well    Physical exam:   Telephone visit only, sounds to have a raspy voice, no obvious pain no obvious shortness of breath, no cough    ASSESSMENT AND PLAN:   Mathew Shook is a 47year old female who presents with the followin. Tonsillitis  I like the idea of changing over the antibiotics to a Z-Ra, and a prednisone taper, start with both of the first day dosage this evening if you get the medication with food in your stomach, next dose is tomorrow morning with breakfast, and then follow the instructions on the bottles for the rest  You are likely not contagious at this point but I would still consider strict hand hygiene, and avoiding anybody that could be immunocompromising to your feeling fully recovered.   You can stop the current antibiotic you are taking and save it for future use if needed  - azithromycin (ZITHROMAX Z-RA) 250 MG Oral Tab; Take 2 tablets (500 mg total) by mouth daily for 1 day, THEN 1 tablet (250 mg total) daily for 4 days. Dispense: 6 tablet; Refill: 0  - predniSONE 20 MG Oral Tab; Use 2 tabs daily for 2 days, 1 tab daily for 2 days then stop  Dispense: 7 tablet; Refill: 0    2. Acute coryza  This should resolve with above treatment    3. Physical exam  Fasting labs try to complete them 3 to 5 days before your next visit with me making sure you do not eat anything that morning only water  - CBC WITH DIFFERENTIAL WITH PLATELET; Future  - COMP METABOLIC PANEL (14); Future  - TSH W REFLEX TO FREE T4; Future  - URINALYSIS WITH CULTURE REFLEX  - VITAMIN D; Future  - LIPID PANEL; Future    Meron Calvin DO  7/6/2023  3:54 PM    Spent 30 minutes obtaining history, evaluating patient, discussing treatment options, diet, exercise, review of available labs and radiology reports, and completing documentation.

## 2023-07-06 NOTE — TELEPHONE ENCOUNTER
Patient was seen the [facility] on 7[facility]/23. She thought she had strept, but testing was negative. She states her tonsils are huge and painful. On the day she went to [facility], she had congestion, body aches too. Patient states now the congestion is more of \"head thing\" and body aches and fatigue are gone. Tonsils are still bothering her. Patient has new symptom as of yesterday. She has discharge coming out of eyes. She is not wearing her contacts because there is a constant film. She has never had this before. Patient is on day 4/10 of Cephalexin. Patient is wondering if this is the right antibiotic for her infection or if she should be switched to something else, since she does not have strept throat.  Pharmacy [facility] in [place].    To Dr. Marine Holter to please advise--

## 2023-07-20 ENCOUNTER — LAB ENCOUNTER (OUTPATIENT)
Dept: LAB | Age: 53
End: 2023-07-20
Attending: INTERNAL MEDICINE
Payer: COMMERCIAL

## 2023-07-20 DIAGNOSIS — Z00.00 PHYSICAL EXAM: ICD-10-CM

## 2023-07-20 DIAGNOSIS — E78.5 HYPERLIPIDEMIA, UNSPECIFIED HYPERLIPIDEMIA TYPE: ICD-10-CM

## 2023-07-20 LAB
ALBUMIN SERPL-MCNC: 3.3 G/DL (ref 3.4–5)
ALBUMIN/GLOB SERPL: 0.9 {RATIO} (ref 1–2)
ALP LIVER SERPL-CCNC: 54 U/L
ALT SERPL-CCNC: 25 U/L
ANION GAP SERPL CALC-SCNC: 8 MMOL/L (ref 0–18)
AST SERPL-CCNC: 17 U/L (ref 15–37)
BASOPHILS # BLD AUTO: 0.09 X10(3) UL (ref 0–0.2)
BASOPHILS NFR BLD AUTO: 1.3 %
BILIRUB SERPL-MCNC: 0.7 MG/DL (ref 0.1–2)
BILIRUB UR QL: NEGATIVE
BUN BLD-MCNC: 12 MG/DL (ref 7–18)
BUN/CREAT SERPL: 16.2 (ref 10–20)
CALCIUM BLD-MCNC: 9.1 MG/DL (ref 8.5–10.1)
CHLORIDE SERPL-SCNC: 106 MMOL/L (ref 98–112)
CHOLEST SERPL-MCNC: 211 MG/DL (ref ?–200)
CLARITY UR: CLEAR
CO2 SERPL-SCNC: 24 MMOL/L (ref 21–32)
COLOR UR: YELLOW
CREAT BLD-MCNC: 0.74 MG/DL
DEPRECATED RDW RBC AUTO: 40.1 FL (ref 35.1–46.3)
EGFRCR SERPLBLD CKD-EPI 2021: 98 ML/MIN/1.73M2 (ref 60–?)
EOSINOPHIL # BLD AUTO: 0.33 X10(3) UL (ref 0–0.7)
EOSINOPHIL NFR BLD AUTO: 4.6 %
ERYTHROCYTE [DISTWIDTH] IN BLOOD BY AUTOMATED COUNT: 12.3 % (ref 11–15)
FASTING PATIENT LIPID ANSWER: YES
FASTING STATUS PATIENT QL REPORTED: YES
GLOBULIN PLAS-MCNC: 3.7 G/DL (ref 2.8–4.4)
GLUCOSE BLD-MCNC: 101 MG/DL (ref 70–99)
GLUCOSE UR-MCNC: NORMAL MG/DL
HCT VFR BLD AUTO: 44.8 %
HDLC SERPL-MCNC: 43 MG/DL (ref 40–59)
HGB BLD-MCNC: 15.4 G/DL
HGB UR QL STRIP.AUTO: NEGATIVE
IMM GRANULOCYTES # BLD AUTO: 0.05 X10(3) UL (ref 0–1)
IMM GRANULOCYTES NFR BLD: 0.7 %
KETONES UR-MCNC: NEGATIVE MG/DL
LDLC SERPL CALC-MCNC: 127 MG/DL (ref ?–100)
LEUKOCYTE ESTERASE UR QL STRIP.AUTO: NEGATIVE
LYMPHOCYTES # BLD AUTO: 1.91 X10(3) UL (ref 1–4)
LYMPHOCYTES NFR BLD AUTO: 26.8 %
MCH RBC QN AUTO: 30.3 PG (ref 26–34)
MCHC RBC AUTO-ENTMCNC: 34.4 G/DL (ref 31–37)
MCV RBC AUTO: 88.2 FL
MONOCYTES # BLD AUTO: 0.65 X10(3) UL (ref 0.1–1)
MONOCYTES NFR BLD AUTO: 9.1 %
NEUTROPHILS # BLD AUTO: 4.11 X10 (3) UL (ref 1.5–7.7)
NEUTROPHILS # BLD AUTO: 4.11 X10(3) UL (ref 1.5–7.7)
NEUTROPHILS NFR BLD AUTO: 57.5 %
NITRITE UR QL STRIP.AUTO: NEGATIVE
NONHDLC SERPL-MCNC: 168 MG/DL (ref ?–130)
OSMOLALITY SERPL CALC.SUM OF ELEC: 286 MOSM/KG (ref 275–295)
PH UR: 6.5 [PH] (ref 5–8)
PLATELET # BLD AUTO: 264 10(3)UL (ref 150–450)
POTASSIUM SERPL-SCNC: 3.6 MMOL/L (ref 3.5–5.1)
PROT SERPL-MCNC: 7 G/DL (ref 6.4–8.2)
PROT UR-MCNC: NEGATIVE MG/DL
RBC # BLD AUTO: 5.08 X10(6)UL
SODIUM SERPL-SCNC: 138 MMOL/L (ref 136–145)
SP GR UR STRIP: 1.02 (ref 1–1.03)
TRIGL SERPL-MCNC: 233 MG/DL (ref 30–149)
TSI SER-ACNC: 0.44 MIU/ML (ref 0.36–3.74)
UROBILINOGEN UR STRIP-ACNC: NORMAL
VIT D+METAB SERPL-MCNC: 65 NG/ML (ref 30–100)
VLDLC SERPL CALC-MCNC: 42 MG/DL (ref 0–30)
WBC # BLD AUTO: 7.1 X10(3) UL (ref 4–11)

## 2023-07-20 PROCEDURE — 80061 LIPID PANEL: CPT

## 2023-07-20 PROCEDURE — 36415 COLL VENOUS BLD VENIPUNCTURE: CPT

## 2023-07-20 PROCEDURE — 84443 ASSAY THYROID STIM HORMONE: CPT

## 2023-07-20 PROCEDURE — 82306 VITAMIN D 25 HYDROXY: CPT

## 2023-07-20 PROCEDURE — 85025 COMPLETE CBC W/AUTO DIFF WBC: CPT

## 2023-07-20 PROCEDURE — 80053 COMPREHEN METABOLIC PANEL: CPT

## 2023-08-03 ENCOUNTER — OFFICE VISIT (OUTPATIENT)
Dept: INTERNAL MEDICINE CLINIC | Facility: CLINIC | Age: 53
End: 2023-08-03

## 2023-08-03 VITALS
HEIGHT: 66 IN | OXYGEN SATURATION: 97 % | SYSTOLIC BLOOD PRESSURE: 118 MMHG | HEART RATE: 79 BPM | TEMPERATURE: 98 F | BODY MASS INDEX: 35.68 KG/M2 | DIASTOLIC BLOOD PRESSURE: 74 MMHG | WEIGHT: 222 LBS

## 2023-08-03 DIAGNOSIS — E03.9 HYPOTHYROIDISM, UNSPECIFIED TYPE: ICD-10-CM

## 2023-08-03 DIAGNOSIS — H93.13 TINNITUS OF BOTH EARS: ICD-10-CM

## 2023-08-03 DIAGNOSIS — Z12.11 ENCOUNTER FOR SCREENING COLONOSCOPY: ICD-10-CM

## 2023-08-03 DIAGNOSIS — Z00.00 PHYSICAL EXAM: Primary | ICD-10-CM

## 2023-08-03 DIAGNOSIS — I10 PRIMARY HYPERTENSION: ICD-10-CM

## 2023-08-03 PROCEDURE — 3008F BODY MASS INDEX DOCD: CPT | Performed by: INTERNAL MEDICINE

## 2023-08-03 PROCEDURE — 3078F DIAST BP <80 MM HG: CPT | Performed by: INTERNAL MEDICINE

## 2023-08-03 PROCEDURE — 3074F SYST BP LT 130 MM HG: CPT | Performed by: INTERNAL MEDICINE

## 2023-08-03 PROCEDURE — 99396 PREV VISIT EST AGE 40-64: CPT | Performed by: INTERNAL MEDICINE

## 2023-08-03 RX ORDER — LOSARTAN POTASSIUM 100 MG/1
100 TABLET ORAL DAILY
Qty: 90 TABLET | Refills: 3 | Status: SHIPPED | OUTPATIENT
Start: 2023-08-03

## 2023-08-03 RX ORDER — LIOTHYRONINE SODIUM 5 UG/1
5 TABLET ORAL DAILY
Qty: 90 TABLET | Refills: 3 | Status: SHIPPED | OUTPATIENT
Start: 2023-08-03

## 2023-08-03 RX ORDER — LEVOTHYROXINE SODIUM 0.15 MG/1
150 TABLET ORAL
Qty: 90 TABLET | Refills: 3 | Status: SHIPPED | OUTPATIENT
Start: 2023-08-03

## 2023-08-03 RX ORDER — CETIRIZINE HYDROCHLORIDE 10 MG/1
10 TABLET ORAL DAILY PRN
COMMUNITY

## 2023-08-03 NOTE — PATIENT INSTRUCTIONS
1. Physical exam  Physical exam instruction: Improve diet and exercise, 4-6 months    - COMP METABOLIC PANEL (14); Future  - LIPID PANEL; Future  - ASSAY, THYROID STIM HORMONE; Future  - FREE T3 (TRIIODOTHYRONINE); Future  - FREE T4 (FREE THYROXINE); Future    2. Primary hypertension  Lets come off the hydrochlorothiazide component when you are done with the next prescription, new meds been sent to the pharmacy, check the potassium level in 3 to 4 months, lab work as planned see above  - losartan 100 MG Oral Tab; Take 1 tablet (100 mg total) by mouth daily. Dispense: 90 tablet; Refill: 3    3. Hypothyroidism, unspecified type  Continue current medications, lab testing with the next 4 to 6-month lab work  - liothyronine 5 MCG Oral Tab; Take 1 tablet (5 mcg total) by mouth daily. Dispense: 90 tablet; Refill: 3  - levothyroxine 150 MCG Oral Tab; Take 1 tablet (150 mcg total) by mouth before breakfast.  Dispense: 90 tablet; Refill: 3    4. Tinnitus of both ears  Continue current monitoring management here, letting me know if we need to get you in with a specialist if this is getting worse    5.  Encounter for screening colonoscopy  Lets go right for the colonoscopy, talk to them about the new prep ways, make a call to the office.  - GASTRO - INTERNAL

## 2023-10-19 ENCOUNTER — TELEPHONE (OUTPATIENT)
Dept: INTERNAL MEDICINE CLINIC | Facility: CLINIC | Age: 53
End: 2023-10-19

## 2023-10-19 DIAGNOSIS — R92.2 DENSE BREAST: ICD-10-CM

## 2023-10-19 DIAGNOSIS — Z12.31 SCREENING MAMMOGRAM, ENCOUNTER FOR: Primary | ICD-10-CM

## 2023-10-19 DIAGNOSIS — R92.30 DENSE BREAST: ICD-10-CM

## 2023-10-19 NOTE — TELEPHONE ENCOUNTER
Ordered per protocol. Left message on patient's cell (OK per HIPPA) notifying patient that screening mammogram order has been placed. Provided pt with central scheduling telephone number. Advised in voicemail to call back with any questions or concerns.

## 2023-10-30 ENCOUNTER — NURSE ONLY (OUTPATIENT)
Facility: CLINIC | Age: 53
End: 2023-10-30

## 2023-10-30 DIAGNOSIS — Z12.11 SPECIAL SCREENING FOR MALIGNANT NEOPLASMS, COLON: Primary | ICD-10-CM

## 2023-10-30 NOTE — PROGRESS NOTES
TCS completed. Patient met criteria to directly schedule for colonoscopy. -Age 40to 73years old  -Negative history of stroke, heart/lung/chronic kidney disease  - Not on Insulin/blood thinners other than aspirin    GI Schedulers,     Please assist patient with scheduling for colonoscopy directly utilizing the Direct Colonoscopy Order set. Thank you.

## 2023-11-06 ENCOUNTER — TELEPHONE (OUTPATIENT)
Dept: INTERNAL MEDICINE CLINIC | Facility: CLINIC | Age: 53
End: 2023-11-06

## 2023-11-06 NOTE — TELEPHONE ENCOUNTER
Patient is calling back. Patient states she does not take her blood pressure readings every day. Patient uses a \"wrist/watch\" to read her blood pressure and she does not trust it. Patient is wondering if she should schedule an appointment for a blood pressure reading.

## 2023-11-06 NOTE — TELEPHONE ENCOUNTER
Patient saw Dr Sb Valdez last in August and was informed her blood pressure seemed to be normal. Patient is on Losartan currently and states Dr Sb Valdez informed her to stay on it for a few months. Patient is due for a Losartan right now but is wondering if she needs to refill the medication or if the directions could be changed to a less amount daily. Ultimately she is trying to get off the mediation completely.  # [phone]   Fyi; patient does not need a new script. She has an active script at the pharmacy but is waiting to hear back from the office before refilling.

## 2023-11-10 ENCOUNTER — TELEPHONE (OUTPATIENT)
Dept: INTERNAL MEDICINE CLINIC | Facility: CLINIC | Age: 53
End: 2023-11-10

## 2023-11-10 ENCOUNTER — OFFICE VISIT (OUTPATIENT)
Dept: INTERNAL MEDICINE CLINIC | Facility: CLINIC | Age: 53
End: 2023-11-10

## 2023-11-10 VITALS
BODY MASS INDEX: 34.74 KG/M2 | WEIGHT: 216.19 LBS | HEART RATE: 71 BPM | SYSTOLIC BLOOD PRESSURE: 120 MMHG | DIASTOLIC BLOOD PRESSURE: 84 MMHG | OXYGEN SATURATION: 99 % | HEIGHT: 66 IN

## 2023-11-10 DIAGNOSIS — B00.1 COLD SORE: ICD-10-CM

## 2023-11-10 DIAGNOSIS — I10 PRIMARY HYPERTENSION: Primary | ICD-10-CM

## 2023-11-10 PROCEDURE — 3074F SYST BP LT 130 MM HG: CPT | Performed by: INTERNAL MEDICINE

## 2023-11-10 PROCEDURE — 99214 OFFICE O/P EST MOD 30 MIN: CPT | Performed by: INTERNAL MEDICINE

## 2023-11-10 PROCEDURE — 3008F BODY MASS INDEX DOCD: CPT | Performed by: INTERNAL MEDICINE

## 2023-11-10 PROCEDURE — 3079F DIAST BP 80-89 MM HG: CPT | Performed by: INTERNAL MEDICINE

## 2023-11-10 RX ORDER — PENCICLOVIR 10 MG/G
1 CREAM TOPICAL
Qty: 5 G | Refills: 1 | Status: SHIPPED | OUTPATIENT
Start: 2023-11-10

## 2023-11-10 RX ORDER — LOSARTAN POTASSIUM 50 MG/1
50 TABLET ORAL DAILY
Qty: 90 TABLET | Refills: 3 | Status: SHIPPED | OUTPATIENT
Start: 2023-11-10

## 2023-11-10 NOTE — PROGRESS NOTES
GI Staff:  TCS Colon Screening Orders    Please schedule: Colonoscopy [id number] / [id number] with MAC OR IV (if appropriate)    Please send split dose Golytely bowel prep     Diagnosis: Colon Screening Z12.11     Medication adjustments:  Day before procedure, hold:  Day of procedure, hold:     >>>Please inform patient if new medications are started after scheduling procedure they need to call clinic to notify us.

## 2023-11-10 NOTE — TELEPHONE ENCOUNTER
Patient saw Dr Duane Sly today and was prescribed Penciclovir 1 % External Cream. Medication is not covered by insurance.  # [phone]  Patient's ID # [id number]. Placed in [employer].

## 2023-11-10 NOTE — PATIENT INSTRUCTIONS
1. Primary hypertension  Lets start cutting the medication in half year, I did send in refills for the losartan 50, lets keep an eye on the blood pressure at home noting the systolic phenomena we here  - losartan 50 MG Oral Tab; Take 1 tablet (50 mg total) by mouth daily. Dispense: 90 tablet; Refill: 3    2. Cold sore  Lets use the penciclovir cream let me know if something is not covered and I need to send in alternatives  - Penciclovir 1 % External Cream; Apply 1 Application topically every 2 (two) hours. Dispense: 5 g; Refill: 1    -Lets remember to do the fasting lab work in February for can and I will reach out to you with results when I see them.

## 2023-11-13 ENCOUNTER — TELEPHONE (OUTPATIENT)
Dept: INTERNAL MEDICINE CLINIC | Facility: CLINIC | Age: 53
End: 2023-11-13

## 2023-11-13 NOTE — PROGRESS NOTES
Prep sent to pt's pharmacy per protocol. Called patient, name/ verified, informed pt that her prep was sent to her pharmacy, instructed her to  prep. Also instructed pt to read prep instructions sent through [employer] ahead of time at least 1 week prior to scheduled colonoscopy. She verbalized understanding, stated she will read the instructions. Pt read prep instructions,     Last read by Lorri Prince at  3:22 PM on 2023. No further action required, TE closed.

## 2023-11-13 NOTE — PROGRESS NOTES
Scheduled for:  Colonoscopy Pax  Provider Name:  Dr. Huma Aguirre  Date:  12/14/2023  Location:  [facility]  Sedation:  IV  Time:  12:30pm, (pt is aware to arrive at 11:30AM)   Prep:  Golytely  Meds/Allergies Reconciled?:  Physician reviewed     Diagnosis with codes:  Colon Cancer Screen Z12.11  Was patient informed to call insurance with codes (Y/N):  Yes, I confirmed [employer] [facility] with this patient. Referral sent?:  Referral was sent at the time of electronic surgical scheduling. [address] [place] [address] or [facility] notified?:  I sent an electronic request to Endo Scheduling and received a confirmation today. Medication Orders:  n/a  Misc Orders:  n/a     Further instructions given by staff:   I discussed the prep instructions with the patient which she verbally understood and is aware that I will send the instructions today.

## 2023-11-13 NOTE — TELEPHONE ENCOUNTER
Please see Gigi hawleyg below:    Hi there. I just called [employer] and they said that you can fill out a Non-Formulary exception- she said this is a link through [employer] who handles their claims. The other two alternatives are : Acyclovir 5% (cream or ointment) and Zovirax 5% (cream or ointment). I use Acyclovir 5% already and this doesn't treat the cold sore. I'm on Day 7 now and it's now just starting to scab. Acyclovir is not very effective, I bet an OTC would do the same job.   If Penciclovir is so much better (as Dr. D'Amico said), then could you fill out the form and see if it can be covered? At this point, waiting an extra 2 days or 10 days doesn't make a difference, because the cold sore is starting to scab over (reaching its end).       Pt. Called today to follow up to see if we can do the exception for her.

## 2023-11-16 NOTE — TELEPHONE ENCOUNTER
Duplicate request rec'd from [facility] [place] regarding:    Penciclovir    \"Plan does not cover medication prescribed.  Per RX benefit plan alternative medications include: Acyclovir CRE 5%\"    Fax placed in green folder    Tasked to RX

## 2023-11-17 ENCOUNTER — HOSPITAL ENCOUNTER (OUTPATIENT)
Dept: MAMMOGRAPHY | Age: 53
Discharge: HOME | End: 2023-11-17
Attending: INTERNAL MEDICINE
Payer: COMMERCIAL

## 2023-11-17 DIAGNOSIS — Z12.31 SCREENING MAMMOGRAM, ENCOUNTER FOR: ICD-10-CM

## 2023-11-17 DIAGNOSIS — R92.30 DENSE BREAST: ICD-10-CM

## 2023-11-17 DIAGNOSIS — R92.2 DENSE BREAST: ICD-10-CM

## 2023-11-17 PROCEDURE — 77067 SCR MAMMO BI INCL CAD: CPT | Performed by: INTERNAL MEDICINE

## 2023-11-17 PROCEDURE — 77063 BREAST TOMOSYNTHESIS BI: CPT | Performed by: INTERNAL MEDICINE

## 2023-12-14 ENCOUNTER — HOSPITAL ENCOUNTER (OUTPATIENT)
Facility: HOSPITAL | Age: 53
Setting detail: HOSPITAL OUTPATIENT SURGERY
Discharge: HOME | End: 2023-12-14
Attending: INTERNAL MEDICINE | Admitting: INTERNAL MEDICINE
Payer: COMMERCIAL

## 2023-12-14 VITALS
WEIGHT: 210 LBS | DIASTOLIC BLOOD PRESSURE: 90 MMHG | BODY MASS INDEX: 33.75 KG/M2 | RESPIRATION RATE: 14 BRPM | HEIGHT: 66 IN | OXYGEN SATURATION: 98 % | SYSTOLIC BLOOD PRESSURE: 135 MMHG | HEART RATE: 69 BPM

## 2023-12-14 DIAGNOSIS — Z12.11 SPECIAL SCREENING FOR MALIGNANT NEOPLASMS, COLON: ICD-10-CM

## 2023-12-14 PROCEDURE — G0500 MOD SEDAT ENDO SERVICE >5YRS: HCPCS | Performed by: INTERNAL MEDICINE

## 2023-12-14 PROCEDURE — 45378 DIAGNOSTIC COLONOSCOPY: CPT | Performed by: INTERNAL MEDICINE

## 2023-12-14 PROCEDURE — 0DJD8ZZ INSPECTION OF LOWER INTESTINAL TRACT, VIA NATURAL OR ARTIFICIAL OPENING ENDOSCOPIC: ICD-10-PCS | Performed by: INTERNAL MEDICINE

## 2023-12-14 RX ORDER — SODIUM CHLORIDE, SODIUM LACTATE, POTASSIUM CHLORIDE, CALCIUM CHLORIDE 600; 310; 30; 20 MG/100ML; MG/100ML; MG/100ML; MG/100ML
INJECTION, SOLUTION INTRAVENOUS CONTINUOUS
Status: DISCONTINUED | OUTPATIENT
Start: 2023-12-14 | End: 2023-12-14

## 2023-12-14 RX ORDER — MIDAZOLAM HYDROCHLORIDE 1 MG/ML
INJECTION INTRAMUSCULAR; INTRAVENOUS
Status: DISCONTINUED | OUTPATIENT
Start: 2023-12-14 | End: 2023-12-14

## 2023-12-14 NOTE — DISCHARGE INSTRUCTIONS

## 2023-12-14 NOTE — OPERATIVE REPORT
COLONOSCOPY REPORT    Emily Mcknight     11/3/1970 Age 47year old   PCP Jonathan Sawant DO Endoscopist Davie Chris MD     Date of procedure: 23    Procedure: Colonoscopy     Pre-operative diagnosis: screening    Post-operative diagnosis: see impression    Medications: Medications: Versed 4 mg IV push. Fentanyl 75 mcg IV push. [Per my order and under my supervision, the patient was sedated with intermittent intravenous doses of versed and fentanyl. The vital signs were monitored and recorded by an experienced RN. The procedure started after the patient was adequately sedated. Total time for moderate conscious sedation was 15 minutes]. Withdrawal time: 8 minutes    Procedure:  Informed consent was obtained from the patient after the risks of the procedure were discussed, including but not limited to bleeding, perforation, aspiration, infection, or possibility of a missed lesion. After discussions of the risks/benefits and alternatives to this procedure, as well as the planned sedation, the patient was placed in the left lateral decubitus position and begun on continuous blood pressure pulse oximetry and EKG monitoring and this was maintained throughout the procedure. Once an adequate level of sedation was obtained a digital rectal exam was completed. Then the lubricated tip of the [id number] diagnostic video colonoscope was inserted and advanced without difficulty to the cecum using the CO2 insufflation technique. The cecum was identified by localizing the trifold, the appendix and the ileocecal valve. Withdrawal was begun with thorough washing and careful examination of the colonic walls and folds. A routine second examination of the cecum/ascending colon was performed. Retroflexion was performed in the rectum. Photodocumentation was obtained. [place] bowel prep score of 9 (Right colon-3; Transverse colon-3, Left colon-3).  I then carefully withdrew the instrument from the patient who tolerated the procedure well. Complications: none. Findings:   -- TONY: normal rectal tone, no masses palpated. -- No mass or polyps    -- A retroflexed view of the rectum revealed no abnormalities. -- The colonic mucosa throughout the colon showed normal vascular pattern, without evidence of angioectasias or inflammation. Impression:   Normal colonoscopy    Recommend:  Repeat colonoscopy in 10 years or earlier if new symptoms arise    >>>If tissue was obtained and you have not received your pathology results either by phone or letter within 2 weeks, please call our office at [phone].     Specimens: none    Blood loss: <1 ml      Saranya Delgado MD  [facility], [facility]astro[facility]

## 2023-12-14 NOTE — H&P
History & Physical Examination    Patient Name: Lorna Saavedra  MRN: [id number]  CSN: [id number]  YOB: 1970    Diagnosis: crc screening    Medications Prior to Admission   Medication Sig Dispense Refill Last Dose    losartan 50 MG Oral Tab Take 1 tablet (50 mg total) by mouth daily. 90 tablet 3 2023 at 2330    liothyronine 5 MCG Oral Tab Take 1 tablet (5 mcg total) by mouth daily. 90 tablet 3 2023 at 0900    levothyroxine 150 MCG Oral Tab Take 1 tablet (150 mcg total) by mouth before breakfast. 90 tablet 3 2023 at 0900    Multiple Vitamins-Minerals (MULTIVITAMIN ADULT, MINERALS,) Oral Tab Take 1 tablet by mouth daily. omeprazole (PRILOSEC) 20 MG Oral Capsule Delayed Release Take 1 capsule by mouth every morning. 90 capsule 3 2023 at 0900    [] polyethylene glycol, PEG 3350-KCl-NaBcb-NaCl-NaSulf, 236 g Oral Recon Soln Take 4,000 mL by mouth once for 1 dose. Take prep as directed by gastro office. May sub w/ any Trilyte/generic or Colyte/generic equivalent if needed. 4000 mL 0     Penciclovir 1 % External Cream Apply 1 Application topically every 2 (two) hours. 5 g 1     cetirizine 10 MG Oral Tab Take 1 tablet (10 mg total) by mouth daily as needed for Allergies. (Patient not taking: Reported on 10/30/2023)   2023    acyclovir 5 % External Ointment Apply 1 Application topically as needed.         Current Facility-Administered Medications   Medication Dose Route Frequency    lactated ringers infusion   Intravenous Continuous       Allergies: No Known Allergies    Past Medical History:   Diagnosis Date    Acid reflux     Allergic rhinitis 2019    Arrhythmia     Disorder of thyroid     Esophageal reflux 2013    High blood pressure     Hypothyroidism     Missed  2008    Missed  @ ~12weeks: D&C    Obesity, unspecified     Unspecified essential hypertension      Past Surgical History:   Procedure Laterality Date    D & C  2008 HYSTERECTOMY  July 2019    Partial    HYSTEROSCOPY      OTHER SURGICAL HISTORY      LSC     Family History   Problem Relation Age of Onset    Hypertension Father     Diabetes Paternal Grandmother     Breast Cancer Neg     Ovarian Cancer Neg      Social History     Tobacco Use    Smoking status: Former     Packs/day: 0.40     Years: 20.00     Additional pack years: 0.00     Total pack years: 8.00     Types: Cigarettes     Quit date: 8/3/2020     Years since quitting: 3.3    Smokeless tobacco: Never    Tobacco comments:     Social smoker   Substance Use Topics    Alcohol use: Yes     Alcohol/week: 14.0 standard drinks of alcohol     Types: 6 Glasses of wine, 8 Standard drinks or equivalent per week     Comment: None during the week         SYSTEM Check if Review is Normal Check if Physical Exam is Normal If not normal, please explain:   HEENT Yin.Martinez ] [ Michael Keene  Yin.Martinez ] [ X]    HEART Yin.Martinez ] [ Marilin Croon Yin.Martinez ] [ Hayes Bucco Yin.Martinez ] [ Chuck Ochoas Yin.Martinez ] [ X]    OTHER        I have discussed the risks and benefits and alternatives of the procedure with the patient/family. They understand and agree to proceed with plan of care. I have reviewed the History and Physical done within the last 30 days. Any changes noted above.     Zarina Stephen MD  [facility], [facility] - Gastroenterology  12/14/2023  1:05 PM

## 2023-12-19 ENCOUNTER — MED REC SCAN ONLY (OUTPATIENT)
Facility: CLINIC | Age: 53
End: 2023-12-19

## 2024-08-05 DIAGNOSIS — E03.9 HYPOTHYROIDISM, UNSPECIFIED TYPE: ICD-10-CM

## 2024-08-06 RX ORDER — LEVOTHYROXINE SODIUM 0.15 MG/1
150 TABLET ORAL
Qty: 90 TABLET | Refills: 3 | Status: SHIPPED | OUTPATIENT
Start: 2024-08-06

## 2024-08-06 RX ORDER — LIOTHYRONINE SODIUM 5 UG/1
5 TABLET ORAL DAILY
Qty: 90 TABLET | Refills: 3 | Status: SHIPPED | OUTPATIENT
Start: 2024-08-06

## 2024-08-06 NOTE — TELEPHONE ENCOUNTER
Refill request is for a maintenance medication and has met the criteria specified in the Ambulatory Medication Refill Standing Order for eligibility, visits, laboratory, alerts and was sent to the requested pharmacy.    Requested Prescriptions     Signed Prescriptions Disp Refills    LIOTHYRONINE 5 MCG Oral Tab 90 tablet 3     Sig: TAKE 1 TABLET(5 MCG) BY MOUTH DAILY     Authorizing Provider: D'AMICO, JEFF ANTHONY     Ordering User: FAUSTO KAY    LEVOTHYROXINE 150 MCG Oral Tab 90 tablet 3     Sig: TAKE 1 TABLET(150 MCG) BY MOUTH BEFORE BREAKFAST     Authorizing Provider: D'AMICO, JEFF ANTHONY     Ordering User: FAUSTO KAY

## 2024-09-06 DIAGNOSIS — I10 PRIMARY HYPERTENSION: ICD-10-CM

## 2024-09-06 RX ORDER — LOSARTAN POTASSIUM 50 MG/1
50 TABLET ORAL DAILY
Qty: 90 TABLET | Refills: 3 | OUTPATIENT
Start: 2024-09-06

## 2024-09-06 NOTE — TELEPHONE ENCOUNTER
Too soon for refill    Current refill request refused due to refill is either a duplicate request or has active refills at the pharmacy.  Check previous templates.    Requested Prescriptions     Refused Prescriptions Disp Refills    LOSARTAN 50 MG Oral Tab [Pharmacy Med Name: LOSARTAN 50MG TABLETS] 90 tablet 3     Sig: TAKE 1 TABLET(50 MG) BY MOUTH DAILY     Refused By: FAUSTO KAY     Reason for Refusal: Patient has requested refill too soon

## 2025-02-13 ENCOUNTER — TELEPHONE (OUTPATIENT)
Dept: INTERNAL MEDICINE CLINIC | Facility: CLINIC | Age: 55
End: 2025-02-13

## 2025-02-13 ENCOUNTER — LAB ENCOUNTER (OUTPATIENT)
Dept: LAB | Age: 55
End: 2025-02-13
Attending: INTERNAL MEDICINE
Payer: COMMERCIAL

## 2025-02-13 ENCOUNTER — OFFICE VISIT (OUTPATIENT)
Dept: INTERNAL MEDICINE CLINIC | Facility: CLINIC | Age: 55
End: 2025-02-13
Payer: COMMERCIAL

## 2025-02-13 VITALS
TEMPERATURE: 98 F | HEART RATE: 69 BPM | OXYGEN SATURATION: 94 % | HEIGHT: 66 IN | WEIGHT: 225 LBS | SYSTOLIC BLOOD PRESSURE: 162 MMHG | BODY MASS INDEX: 36.16 KG/M2 | DIASTOLIC BLOOD PRESSURE: 92 MMHG

## 2025-02-13 DIAGNOSIS — E78.2 MIXED HYPERLIPIDEMIA: ICD-10-CM

## 2025-02-13 DIAGNOSIS — E66.01 CLASS 2 SEVERE OBESITY WITH SERIOUS COMORBIDITY AND BODY MASS INDEX (BMI) OF 36.0 TO 36.9 IN ADULT, UNSPECIFIED OBESITY TYPE (HCC): ICD-10-CM

## 2025-02-13 DIAGNOSIS — I10 PRIMARY HYPERTENSION: ICD-10-CM

## 2025-02-13 DIAGNOSIS — H93.13 TINNITUS OF BOTH EARS: ICD-10-CM

## 2025-02-13 DIAGNOSIS — E66.812 CLASS 2 SEVERE OBESITY WITH SERIOUS COMORBIDITY AND BODY MASS INDEX (BMI) OF 36.0 TO 36.9 IN ADULT, UNSPECIFIED OBESITY TYPE (HCC): ICD-10-CM

## 2025-02-13 DIAGNOSIS — E03.9 HYPOTHYROIDISM, UNSPECIFIED TYPE: ICD-10-CM

## 2025-02-13 DIAGNOSIS — E03.9 HYPOTHYROIDISM, UNSPECIFIED TYPE: Primary | ICD-10-CM

## 2025-02-13 DIAGNOSIS — I10 ESSENTIAL HYPERTENSION: ICD-10-CM

## 2025-02-13 DIAGNOSIS — Z00.00 PHYSICAL EXAM: ICD-10-CM

## 2025-02-13 DIAGNOSIS — Z12.31 SCREENING MAMMOGRAM, ENCOUNTER FOR: ICD-10-CM

## 2025-02-13 DIAGNOSIS — Z86.19 HISTORY OF COLD SORES: ICD-10-CM

## 2025-02-13 DIAGNOSIS — Z00.00 ANNUAL PHYSICAL EXAM: Primary | ICD-10-CM

## 2025-02-13 LAB
ALBUMIN SERPL-MCNC: 4.8 G/DL (ref 3.2–4.8)
ALBUMIN/GLOB SERPL: 1.5 {RATIO} (ref 1–2)
ALP LIVER SERPL-CCNC: 70 U/L
ALT SERPL-CCNC: 20 U/L
ANION GAP SERPL CALC-SCNC: 5 MMOL/L (ref 0–18)
AST SERPL-CCNC: 21 U/L (ref ?–34)
ATRIAL RATE: 60 BPM
BASOPHILS # BLD AUTO: 0.1 X10(3) UL (ref 0–0.2)
BASOPHILS NFR BLD AUTO: 1.6 %
BILIRUB SERPL-MCNC: 0.7 MG/DL (ref 0.3–1.2)
BILIRUB UR QL: NEGATIVE
BUN BLD-MCNC: 9 MG/DL (ref 9–23)
BUN/CREAT SERPL: 10.7 (ref 10–20)
CALCIUM BLD-MCNC: 9.8 MG/DL (ref 8.7–10.4)
CHLORIDE SERPL-SCNC: 101 MMOL/L (ref 98–112)
CHOLEST SERPL-MCNC: 231 MG/DL (ref ?–200)
CLARITY UR: CLEAR
CO2 SERPL-SCNC: 29 MMOL/L (ref 21–32)
CREAT BLD-MCNC: 0.84 MG/DL
DEPRECATED RDW RBC AUTO: 39.5 FL (ref 35.1–46.3)
EGFRCR SERPLBLD CKD-EPI 2021: 83 ML/MIN/1.73M2 (ref 60–?)
EOSINOPHIL # BLD AUTO: 0.16 X10(3) UL (ref 0–0.7)
EOSINOPHIL NFR BLD AUTO: 2.6 %
ERYTHROCYTE [DISTWIDTH] IN BLOOD BY AUTOMATED COUNT: 11.9 % (ref 11–15)
FASTING PATIENT LIPID ANSWER: YES
FASTING STATUS PATIENT QL REPORTED: YES
GLOBULIN PLAS-MCNC: 3.1 G/DL (ref 2–3.5)
GLUCOSE BLD-MCNC: 91 MG/DL (ref 70–99)
GLUCOSE UR-MCNC: NORMAL MG/DL
HCT VFR BLD AUTO: 46.9 %
HDLC SERPL-MCNC: 55 MG/DL (ref 40–59)
HGB BLD-MCNC: 16.3 G/DL
HGB UR QL STRIP.AUTO: NEGATIVE
IMM GRANULOCYTES # BLD AUTO: 0.02 X10(3) UL (ref 0–1)
IMM GRANULOCYTES NFR BLD: 0.3 %
KETONES UR-MCNC: NEGATIVE MG/DL
LDLC SERPL CALC-MCNC: 142 MG/DL (ref ?–100)
LEUKOCYTE ESTERASE UR QL STRIP.AUTO: NEGATIVE
LYMPHOCYTES # BLD AUTO: 1.93 X10(3) UL (ref 1–4)
LYMPHOCYTES NFR BLD AUTO: 31.2 %
MCH RBC QN AUTO: 31.5 PG (ref 26–34)
MCHC RBC AUTO-ENTMCNC: 34.8 G/DL (ref 31–37)
MCV RBC AUTO: 90.5 FL
MONOCYTES # BLD AUTO: 0.55 X10(3) UL (ref 0.1–1)
MONOCYTES NFR BLD AUTO: 8.9 %
NEUTROPHILS # BLD AUTO: 3.43 X10 (3) UL (ref 1.5–7.7)
NEUTROPHILS # BLD AUTO: 3.43 X10(3) UL (ref 1.5–7.7)
NEUTROPHILS NFR BLD AUTO: 55.4 %
NITRITE UR QL STRIP.AUTO: NEGATIVE
NONHDLC SERPL-MCNC: 176 MG/DL (ref ?–130)
OSMOLALITY SERPL CALC.SUM OF ELEC: 278 MOSM/KG (ref 275–295)
P AXIS: 36 DEGREES
P-R INTERVAL: 164 MS
PH UR: 5 [PH] (ref 5–8)
PLATELET # BLD AUTO: 257 10(3)UL (ref 150–450)
POTASSIUM SERPL-SCNC: 4.5 MMOL/L (ref 3.5–5.1)
PROT SERPL-MCNC: 7.9 G/DL (ref 5.7–8.2)
PROT UR-MCNC: NEGATIVE MG/DL
Q-T INTERVAL: 456 MS
QRS DURATION: 84 MS
QTC CALCULATION (BEZET): 456 MS
R AXIS: 4 DEGREES
RBC # BLD AUTO: 5.18 X10(6)UL
SODIUM SERPL-SCNC: 135 MMOL/L (ref 136–145)
SP GR UR STRIP: 1.01 (ref 1–1.03)
T AXIS: 39 DEGREES
T4 FREE SERPL-MCNC: 2 NG/DL (ref 0.8–1.7)
TRIGL SERPL-MCNC: 192 MG/DL (ref 30–149)
TSI SER-ACNC: 0.19 UIU/ML (ref 0.55–4.78)
UROBILINOGEN UR STRIP-ACNC: NORMAL
VENTRICULAR RATE: 60 BPM
VIT D+METAB SERPL-MCNC: 33.4 NG/ML (ref 30–100)
VLDLC SERPL CALC-MCNC: 36 MG/DL (ref 0–30)
WBC # BLD AUTO: 6.2 X10(3) UL (ref 4–11)

## 2025-02-13 PROCEDURE — 80053 COMPREHEN METABOLIC PANEL: CPT

## 2025-02-13 PROCEDURE — 82306 VITAMIN D 25 HYDROXY: CPT

## 2025-02-13 PROCEDURE — 84443 ASSAY THYROID STIM HORMONE: CPT

## 2025-02-13 PROCEDURE — 84439 ASSAY OF FREE THYROXINE: CPT

## 2025-02-13 PROCEDURE — 81003 URINALYSIS AUTO W/O SCOPE: CPT | Performed by: INTERNAL MEDICINE

## 2025-02-13 PROCEDURE — 80061 LIPID PANEL: CPT

## 2025-02-13 PROCEDURE — 83036 HEMOGLOBIN GLYCOSYLATED A1C: CPT | Performed by: INTERNAL MEDICINE

## 2025-02-13 PROCEDURE — 36415 COLL VENOUS BLD VENIPUNCTURE: CPT

## 2025-02-13 PROCEDURE — 85025 COMPLETE CBC W/AUTO DIFF WBC: CPT

## 2025-02-13 RX ORDER — ACYCLOVIR 50 MG/G
1 OINTMENT TOPICAL AS NEEDED
Qty: 30 G | Refills: 1 | Status: SHIPPED | OUTPATIENT
Start: 2025-02-13

## 2025-02-13 RX ORDER — TIRZEPATIDE 2.5 MG/.5ML
2.5 INJECTION, SOLUTION SUBCUTANEOUS WEEKLY
Qty: 2 ML | Refills: 1 | Status: SHIPPED | OUTPATIENT
Start: 2025-02-13 | End: 2025-03-07

## 2025-02-13 RX ORDER — LOSARTAN POTASSIUM 50 MG/1
50 TABLET ORAL DAILY
Qty: 90 TABLET | Refills: 3 | Status: SHIPPED | OUTPATIENT
Start: 2025-02-13

## 2025-02-13 NOTE — PATIENT INSTRUCTIONS
1. Annual physical exam  Physical exam instruction: Improve diet and exercise, complete fasting labs in the near future and you will be called with results 5-7 days after completed, call with questions.  Call the central scheduling number at [phone] to schedule at any of the [facility] locations  - EKG In-Office [[id number]]: Sinus rhythm at 60 beats minute, no ST-T wave changes, normal EKG    2. Primary hypertension  Stable cont monitoring and management, lets go back on the losartan, and update me with blood pressure readings as you are doing at home on MyChart if we can.  Try to use the same technique we talked about today.  - losartan 50 MG Oral Tab; Take 1 tablet (50 mg total) by mouth daily.  Dispense: 90 tablet; Refill: 3    3. Hypothyroidism, unspecified type  Stable continue current replacement here, lab work today    4. Tinnitus of both ears  Stable, continue current monitor management, home medications    5. History of cold sores  Refilled  - acyclovir 5 % External Ointment; Apply 1 Application  topically as needed.  Dispense: 30 g; Refill: 1    6. Class 2 severe obesity with serious comorbidity and body mass index (BMI) of 36.0 to 36.9 in adult, unspecified obesity type (HCC)  I like the idea of pricing out a Zepbound, and see if we can start on this, we go a week by week with those medications, you update me before you are needing a refill to decide whether uptitrating or staying at the same dosage  - Tirzepatide-Weight Management (ZEPBOUND) 2.5 MG/0.5ML Subcutaneous Solution Auto-injector; Inject 2.5 mg into the skin once a week for 4 doses.  Dispense: 2 mL; Refill: 1    7. Screening mammogram, encounter for  Mammogram has been placed  - [facility] ANSHUL 2D+3D SCREENING BILAT (CPT=77067/97021); Future    8. Mixed hyperlipidemia  Calcium scoring has been placed, I do like the idea of you getting that done as well and I will notify you with results once completed  - CT CALCIUM SCORING; Future

## 2025-02-13 NOTE — TELEPHONE ENCOUNTER
Patient called to ask if  Annual Labs can be entered this morning so she can get them done prior to her appointment this afternoon at 3:00pm.     Please call patient at [phone]

## 2025-02-14 LAB
EST. AVERAGE GLUCOSE BLD GHB EST-MCNC: 111 MG/DL (ref 68–126)
HBA1C MFR BLD: 5.5 % (ref ?–5.7)

## 2025-02-14 NOTE — PROGRESS NOTES
HPI:   Clarisa Castro is a 54 year old female who presents for a complete physical exam.     Patient complains of: Physical exam     patient is here today for physical exam, patient is up-to-date with age-related standard of care screening and vaccination recommendations, this was discussed at length and they verbalized understanding of any deficiencies.    Hypertension: Patient rechecked with blood pressure today, continues to have high blood pressure, claims has been off of her usual blood pressure medication for a few days she has been using some older medication she had at home, she is unsure as exactly what it is.    Weight gain: Patient has made multiple efforts for weight loss in her lifetime, she continues on her thyroid dose replacement, and is wondering about weight loss medications, GLP-1 agonist, she is unsure of her current insurance coverage for this.  This was discussed at length.    History of cold sores: Patient does have a history of cold sores, is requesting Zovirax cream, as entertained the thought of using acyclovir pills in the past but does not go into those lengths.    Hyperlipidemia: Patient is due for lab work, has had a history of high cholesterol in the past.  Diet is average.  Active, no regimented exercise.    Current Outpatient Medications   Medication Sig Dispense Refill    acyclovir 5 % External Ointment Apply 1 Application  topically as needed. 30 g 1    losartan 50 MG Oral Tab Take 1 tablet (50 mg total) by mouth daily. 90 tablet 3    Tirzepatide-Weight Management (ZEPBOUND) 2.5 MG/0.5ML Subcutaneous Solution Auto-injector Inject 2.5 mg into the skin once a week for 4 doses. 2 mL 1    LIOTHYRONINE 5 MCG Oral Tab TAKE 1 TABLET(5 MCG) BY MOUTH DAILY 90 tablet 3    LEVOTHYROXINE 150 MCG Oral Tab TAKE 1 TABLET(150 MCG) BY MOUTH BEFORE BREAKFAST 90 tablet 3    cetirizine 10 MG Oral Tab Take 1 tablet (10 mg total) by mouth daily as needed for Allergies.      Multiple  Vitamins-Minerals (MULTIVITAMIN ADULT, MINERALS,) Oral Tab Take 1 tablet by mouth daily.      omeprazole (PRILOSEC) 20 MG Oral Capsule Delayed Release Take 1 capsule by mouth every morning. 90 capsule 3      Past Medical History:    Acid reflux    Allergic rhinitis    Arrhythmia    Disorder of thyroid    Esophageal reflux    High blood pressure    Hypothyroidism    Missed  (HCC)    Missed  @ ~12weeks: D&C    Obesity, unspecified    Unspecified essential hypertension      Past Surgical History:   Procedure Laterality Date    Colonoscopy N/A 2023    Procedure: COLONOSCOPY;  Surgeon: Pau Dumont MD;  Location: [facility] ENDOSCOPY    D & c  2008    Hysterectomy  2019    Partial    Hysteroscopy      Other surgical history      LSC      Family History   Problem Relation Age of Onset    Hypertension Father     Diabetes Paternal Grandmother     Breast Cancer Neg     Ovarian Cancer Neg       Social History:   Social History     Socioeconomic History    Marital status:    Occupational History    Occupation: [employer]   Tobacco Use    Smoking status: Former     Current packs/day: 0.00     Average packs/day: 0.4 packs/day for 20.0 years (8.0 ttl pk-yrs)     Types: Cigarettes     Start date: 8/3/2000     Quit date: 8/3/2020     Years since quittin.5     Passive exposure: Current    Smokeless tobacco: Never    Tobacco comments:     Social smoker   Vaping Use    Vaping status: Never Used   Substance and Sexual Activity    Alcohol use: Yes     Alcohol/week: 6.0 standard drinks of alcohol     Types: 6 Standard drinks or equivalent per week    Drug use: No    Sexual activity: Yes   Other Topics Concern    Caffeine Concern Yes     Comment: Coffee 1 cup; Tea 3-4 cups a week;     Exercise No        REVIEW OF SYSTEMS:   REVIEW OF SYSTEMS:  Constitutional: Negative for Chills, fatigue, fever, malaise, weight gain and weight loss.  ENMT: Negative for Nasal drainage and sinus  pressure.  Eyes: Negative for Vision changes.  Respiratory: Negative for Cough, dyspnea and wheezing.  Cardio: Negative Chest pain and irregular heartbeat/palpitations.  GI: Negative for Abdominal pain, constipation, diarrhea, heartburn, nausea and vomiting.  : Negative for Dysuria and urinary frequency.  Endocrine: Negative for Cold intolerance and heat intolerance.  Neuro: Negative for Gait disturbance and memory impairment.  Psych: Negative for Anxiety and depression.  Integumentary: Negative for Hives and rash.  MS: Negative muscle weakness. neg for joint pain  Hema/Lymph: Negative Easy bleeding and easy bruising.  Allergic/Immuno: Negative Environmental allergies and food allergies.    EXAM:   BP (!) 162/92   Pulse 69   Temp 97.7 °F (36.5 °C) (Oral)   Ht 5' 6\" (1.676 m)   Wt 225 lb (102.1 kg)   LMP 06/01/2019   SpO2 94%   Breastfeeding No   BMI 36.32 kg/m²   Body mass index is 36.32 kg/m².   PHYSICAL EXAMINATION:     Gen. exam: Alert and oriented, in no acute distress   HEENT: Pupils equal and reactive to light and accommodation, moist mucous membranes  Neck exam:  Supple with baseline range of motion.  Normal thyroid trachea midline, no JVD  Heart exam: Regular rate and rhythm no murmurs no S3 no S4   Lung exam: No rales no rhonchi no wheezes  Abdominal exam: Soft nontender nondistended positive bowel sounds are normoactive, obese abdomen  Extremities exam: no clubbing no cyanosis no edema  Skin exam: No obvious wounds, no rashes  Neurological exam: Cranial nerves II through XII intact, no gross deficits  Musculoskeletal exam: no arthritis appreciated, no obvious deformity    ASSESSMENT AND PLAN:   Clarisa Castro is a 54 year old female who presents for a complete physical exam.   1. Annual physical exam  Physical exam instruction: Improve diet and exercise, complete fasting labs in the near future and you will be called with results 5-7 days after completed, call with questions.  Call the  central scheduling number at [phone] to schedule at any of the [facility] locations  - EKG In-Office [[id number]]: Sinus rhythm at 60 beats minute, no ST-T wave changes, normal EKG    2. Primary hypertension  Stable cont monitoring and management, lets go back on the losartan, and update me with blood pressure readings as you are doing at home on MyChart if we can.  Try to use the same technique we talked about today.  - losartan 50 MG Oral Tab; Take 1 tablet (50 mg total) by mouth daily.  Dispense: 90 tablet; Refill: 3    3. Hypothyroidism, unspecified type  Stable continue current replacement here, lab work today    4. Tinnitus of both ears  Stable, continue current monitor management, home medications    5. History of cold sores  Refilled  - acyclovir 5 % External Ointment; Apply 1 Application  topically as needed.  Dispense: 30 g; Refill: 1    6. Class 2 severe obesity with serious comorbidity and body mass index (BMI) of 36.0 to 36.9 in adult, unspecified obesity type (HCC)  I like the idea of pricing out a Zepbound, and see if we can start on this, we go a week by week with those medications, you update me before you are needing a refill to decide whether uptitrating or staying at the same dosage  - Tirzepatide-Weight Management (ZEPBOUND) 2.5 MG/0.5ML Subcutaneous Solution Auto-injector; Inject 2.5 mg into the skin once a week for 4 doses.  Dispense: 2 mL; Refill: 1    7. Screening mammogram, encounter for  Mammogram has been placed  - [facility] ANSHUL 2D+3D SCREENING BILAT (CPT=77067/49095); Future    8. Mixed hyperlipidemia  Calcium scoring has been placed, I do like the idea of you getting that done as well and I will notify you with results once completed  - CT CALCIUM SCORING; Future        Spent 45 minutes obtaining history, evaluating patient, discussing treatment options, diet, exercise, review of available labs and radiology reports, and completing documentation.    Jeff Anthony D'Amico, DO  2/13/2025  7:04  PM

## 2025-02-17 ENCOUNTER — TELEPHONE (OUTPATIENT)
Dept: INTERNAL MEDICINE CLINIC | Facility: CLINIC | Age: 55
End: 2025-02-17

## 2025-02-17 NOTE — TELEPHONE ENCOUNTER
Drug Change Request for Zepbound received from [facility]    Plan does not cover medication prescribed    Per Rx benefit plan alternative medications include: Wegovy Inj 0.25 MG   Please fax back with approval     Fax placed in green folder

## 2025-03-27 ENCOUNTER — TELEPHONE (OUTPATIENT)
Dept: INTERNAL MEDICINE CLINIC | Facility: CLINIC | Age: 55
End: 2025-03-27

## 2025-03-27 ENCOUNTER — PATIENT MESSAGE (OUTPATIENT)
Dept: INTERNAL MEDICINE CLINIC | Facility: CLINIC | Age: 55
End: 2025-03-27

## 2025-03-27 DIAGNOSIS — E03.9 HYPOTHYROIDISM, UNSPECIFIED TYPE: Primary | ICD-10-CM

## 2025-03-27 NOTE — TELEPHONE ENCOUNTER
To Dr. PHILLIPS:    Please advise on below Sistemic message.        Clarisa DONAHUE [facility] Clinical Staff (supporting Jeff Anthony D'Amico, )1 hour ago (3:51 PM)       Hello Dr. D'Amico, since you mentioned we may be overdoing the thyroid medicine and it seems like results for Free T3 & TSH w/reflex to Free T4, are both out of range since last 1-2 years, should I stop taking the Liothyronine since it's only 5mcg? Could that be causing the out issue or the Levothyroxine as well?   Thank you for the messages.

## 2025-03-28 ENCOUNTER — PATIENT MESSAGE (OUTPATIENT)
Dept: INTERNAL MEDICINE CLINIC | Facility: CLINIC | Age: 55
End: 2025-03-28

## 2025-03-28 NOTE — TELEPHONE ENCOUNTER
MyChart response to the patient, I recommend taking the levothyroxine with food to decrease its absorption by 20%, then repeating the TFTs in 6 weeks.  She also can have the option to lower this levothyroxine, but I would keep the liothyronine the same

## 2025-04-04 ENCOUNTER — HOSPITAL ENCOUNTER (OUTPATIENT)
Dept: MAMMOGRAPHY | Facility: HOSPITAL | Age: 55
Discharge: HOME | End: 2025-04-04
Attending: INTERNAL MEDICINE
Payer: COMMERCIAL

## 2025-04-04 DIAGNOSIS — Z12.31 SCREENING MAMMOGRAM, ENCOUNTER FOR: ICD-10-CM

## 2025-04-04 DIAGNOSIS — Z00.00 ANNUAL PHYSICAL EXAM: ICD-10-CM

## 2025-04-04 PROCEDURE — 77067 SCR MAMMO BI INCL CAD: CPT | Performed by: INTERNAL MEDICINE

## 2025-04-04 PROCEDURE — 77063 BREAST TOMOSYNTHESIS BI: CPT | Performed by: INTERNAL MEDICINE

## 2025-04-26 ENCOUNTER — HOSPITAL ENCOUNTER (EMERGENCY)
Facility: HOSPITAL | Age: 55
Discharge: HOME | End: 2025-04-26
Attending: EMERGENCY MEDICINE
Payer: COMMERCIAL

## 2025-04-26 ENCOUNTER — HOSPITAL ENCOUNTER (OUTPATIENT)
Age: 55
Discharge: ANOTHER HOSPITAL | End: 2025-04-26
Payer: COMMERCIAL

## 2025-04-26 ENCOUNTER — APPOINTMENT (OUTPATIENT)
Dept: CT IMAGING | Facility: HOSPITAL | Age: 55
End: 2025-04-26
Attending: EMERGENCY MEDICINE
Payer: COMMERCIAL

## 2025-04-26 VITALS
TEMPERATURE: 98 F | HEART RATE: 81 BPM | RESPIRATION RATE: 18 BRPM | SYSTOLIC BLOOD PRESSURE: 176 MMHG | DIASTOLIC BLOOD PRESSURE: 96 MMHG | OXYGEN SATURATION: 96 %

## 2025-04-26 VITALS
OXYGEN SATURATION: 95 % | WEIGHT: 220 LBS | SYSTOLIC BLOOD PRESSURE: 178 MMHG | HEIGHT: 66 IN | BODY MASS INDEX: 35.36 KG/M2 | HEART RATE: 67 BPM | DIASTOLIC BLOOD PRESSURE: 91 MMHG | RESPIRATION RATE: 20 BRPM | TEMPERATURE: 98 F

## 2025-04-26 DIAGNOSIS — Z23 ENCOUNTER FOR IMMUNIZATION: ICD-10-CM
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
## 2025-04-26 DIAGNOSIS — H93.19 TINNITUS, UNSPECIFIED LATERALITY: ICD-10-CM

## 2025-04-26 DIAGNOSIS — T14.8XXA ABRASION: ICD-10-CM

## 2025-04-26 DIAGNOSIS — S09.90XA INJURY OF HEAD, INITIAL ENCOUNTER: ICD-10-CM

## 2025-04-26 DIAGNOSIS — S00.83XA CONTUSION OF FACE, INITIAL ENCOUNTER: Primary | ICD-10-CM

## 2025-04-26 LAB
ANION GAP SERPL CALC-SCNC: 10 MMOL/L (ref 0–18)
BASOPHILS # BLD AUTO: 0.07 X10(3) UL (ref 0–0.2)
BASOPHILS NFR BLD AUTO: 0.8 %
BUN BLD-MCNC: 13 MG/DL (ref 9–23)
BUN/CREAT SERPL: 16.5 (ref 10–20)
CALCIUM BLD-MCNC: 9 MG/DL (ref 8.7–10.4)
CHLORIDE SERPL-SCNC: 108 MMOL/L (ref 98–112)
CO2 SERPL-SCNC: 22 MMOL/L (ref 21–32)
CREAT BLD-MCNC: 0.79 MG/DL (ref 0.55–1.02)
DEPRECATED RDW RBC AUTO: 41.9 FL (ref 35.1–46.3)
EGFRCR SERPLBLD CKD-EPI 2021: 89 ML/MIN/1.73M2 (ref 60–?)
EOSINOPHIL # BLD AUTO: 0.25 X10(3) UL (ref 0–0.7)
EOSINOPHIL NFR BLD AUTO: 2.7 %
ERYTHROCYTE [DISTWIDTH] IN BLOOD BY AUTOMATED COUNT: 12.8 % (ref 11–15)
GLUCOSE BLD-MCNC: 92 MG/DL (ref 70–99)
HCT VFR BLD AUTO: 44 % (ref 35–48)
HGB BLD-MCNC: 15.3 G/DL (ref 12–16)
IMM GRANULOCYTES # BLD AUTO: 0.04 X10(3) UL (ref 0–1)
IMM GRANULOCYTES NFR BLD: 0.4 %
LYMPHOCYTES # BLD AUTO: 2.11 X10(3) UL (ref 1–4)
LYMPHOCYTES NFR BLD AUTO: 23 %
MAGNESIUM SERPL-MCNC: 1.8 MG/DL (ref 1.6–2.6)
MCH RBC QN AUTO: 30.8 PG (ref 26–34)
MCHC RBC AUTO-ENTMCNC: 34.8 G/DL (ref 31–37)
MCV RBC AUTO: 88.5 FL (ref 80–100)
MONOCYTES # BLD AUTO: 0.7 X10(3) UL (ref 0.1–1)
MONOCYTES NFR BLD AUTO: 7.6 %
NEUTROPHILS # BLD AUTO: 5.99 X10 (3) UL (ref 1.5–7.7)
NEUTROPHILS # BLD AUTO: 5.99 X10(3) UL (ref 1.5–7.7)
NEUTROPHILS NFR BLD AUTO: 65.5 %
OSMOLALITY SERPL CALC.SUM OF ELEC: 290 MOSM/KG (ref 275–295)
PLATELET # BLD AUTO: 236 10(3)UL (ref 150–450)
POTASSIUM SERPL-SCNC: 4.7 MMOL/L (ref 3.5–5.1)
RBC # BLD AUTO: 4.97 X10(6)UL (ref 3.8–5.3)
SODIUM SERPL-SCNC: 140 MMOL/L (ref 136–145)
TROPONIN I SERPL HS-MCNC: <3 NG/L (ref ?–34)
WBC # BLD AUTO: 9.2 X10(3) UL (ref 4–11)

## 2025-04-26 PROCEDURE — 70496 CT ANGIOGRAPHY HEAD: CPT | Performed by: EMERGENCY MEDICINE

## 2025-04-26 PROCEDURE — 84484 ASSAY OF TROPONIN QUANT: CPT | Performed by: EMERGENCY MEDICINE

## 2025-04-26 PROCEDURE — 96360 HYDRATION IV INFUSION INIT: CPT

## 2025-04-26 PROCEDURE — 83735 ASSAY OF MAGNESIUM: CPT | Performed by: EMERGENCY MEDICINE

## 2025-04-26 PROCEDURE — 99285 EMERGENCY DEPT VISIT HI MDM: CPT

## 2025-04-26 PROCEDURE — 93010 ELECTROCARDIOGRAM REPORT: CPT

## 2025-04-26 PROCEDURE — 80048 BASIC METABOLIC PNL TOTAL CA: CPT | Performed by: EMERGENCY MEDICINE

## 2025-04-26 PROCEDURE — 99284 EMERGENCY DEPT VISIT MOD MDM: CPT

## 2025-04-26 PROCEDURE — 85025 COMPLETE CBC W/AUTO DIFF WBC: CPT | Performed by: EMERGENCY MEDICINE

## 2025-04-26 PROCEDURE — 99214 OFFICE O/P EST MOD 30 MIN: CPT

## 2025-04-26 PROCEDURE — 93005 ELECTROCARDIOGRAM TRACING: CPT

## 2025-04-26 PROCEDURE — 70498 CT ANGIOGRAPHY NECK: CPT | Performed by: EMERGENCY MEDICINE

## 2025-04-26 PROCEDURE — 90471 IMMUNIZATION ADMIN: CPT

## 2025-04-26 NOTE — ED INITIAL ASSESSMENT (HPI)
Pt sent here from Urgent care for further evaluation of facial injury, + abrasions , per pt she has been having ringing in her ear started Early March, felt lightheaded last night, loss her balance and fell forward on concrete ,  happened last night, per pt she was drinking alcohol last night.  Denies LOC.  Pt is A/OX 4, breathing unlabored.  Not taking blood thinners.  Denies headache.  Pt refused to do EKG here again.

## 2025-04-26 NOTE — ED PROVIDER NOTES
Patient Seen in: [facility]      History     Chief Complaint   Patient presents with    Contusion     Stated Complaint: Fall    Subjective:   HPI      55 y/o F pmh HTN with multiple complaints status post fall last night.  Patient states she is unsure if she tripped on her shoes and lost balance or felt unbalanced and subsequently fell forward landing and hitting her face/forehead. Denies LOC.  Multiple abrasions on her face.  She reports mild blurry vision in the right eye, pain below her right eye and pain to her lip and under her nose.  She reports feeling overall \"woozy\".     Denies HA, neck pain, dizziness, CP, SOB, palpitations, extremity numbness/paresthesias, difficulty walking. Not on blood thinners. Unsure of tetanus status.       History of Present Illness               Objective:     Past Medical History:    Acid reflux    Allergic rhinitis    Arrhythmia    Disorder of thyroid    Esophageal reflux    High blood pressure    Hypothyroidism    Missed  (HCC)    Missed  @ ~12weeks: D&C    Obesity, unspecified    Unspecified essential hypertension              Past Surgical History:   Procedure Laterality Date    Colonoscopy N/A 2023    Procedure: COLONOSCOPY;  Surgeon: Pau Dumont MD;  Location: [facility] ENDOSCOPY    D & c  2008    Hysterectomy  2019    Partial    Hysteroscopy      Other surgical history      LSC                Social History     Socioeconomic History    Marital status:    Occupational History    Occupation: Facility [employer]   Tobacco Use    Smoking status: Former     Current packs/day: 0.00     Average packs/day: 0.4 packs/day for 20.0 years (8.0 ttl pk-yrs)     Types: Cigarettes     Start date: 8/3/2000     Quit date: 8/3/2020     Years since quittin.7     Passive exposure: Current    Smokeless tobacco: Never    Tobacco comments:     Social smoker   Vaping Use    Vaping status: Never Used   Substance and Sexual Activity    Alcohol  use: Yes     Alcohol/week: 6.0 standard drinks of alcohol     Types: 6 Standard drinks or equivalent per week    Drug use: No    Sexual activity: Yes   Other Topics Concern    Caffeine Concern Yes     Comment: Coffee 1 cup; Tea 3-4 cups a week;     Exercise No              Review of Systems    Positive for stated complaint: Fall  Other systems are as noted in HPI.  Constitutional and vital signs reviewed.      All other systems reviewed and negative except as noted above.                  Physical Exam     ED Triage Vitals [04/26/25 1512]   BP (!) 176/96   Pulse 81   Resp 18   Temp 97.8 °F (36.6 °C)   Temp src Oral   SpO2 96 %   O2 Device None (Room air)       Current Vitals:   Vital Signs  BP: (!) 176/96  Pulse: 81  Resp: 18  Temp: 97.8 °F (36.6 °C)  Temp src: Oral    Oxygen Therapy  SpO2: 96 %  O2 Device: None (Room air)        Physical Exam  Vitals and nursing note reviewed.   Constitutional:       General: She is not in acute distress.     Appearance: Normal appearance. She is not ill-appearing or toxic-appearing.   HENT:      Head: Normocephalic.      Nose: Nose normal.      Comments: No septal hematoma appreciated      Mouth/Throat:      Mouth: Mucous membranes are moist.   Eyes:      Extraocular Movements: Extraocular movements intact.      Conjunctiva/sclera: Conjunctivae normal.      Pupils: Pupils are equal, round, and reactive to light.      Comments: Tenderness palpation under the right eye.  No significant deformity. EOM intact without appreciated entrapment.    Cardiovascular:      Rate and Rhythm: Normal rate and regular rhythm.   Pulmonary:      Effort: Pulmonary effort is normal. No respiratory distress.      Breath sounds: Normal breath sounds. No wheezing.   Musculoskeletal:         General: Normal range of motion.      Cervical back: Normal range of motion.   Skin:     General: Skin is warm.      Comments: Several scattered superficial abrasions over the face.  Large abrasion under the right eye  and over the upper mid lip.  No gaping or indication for repair.  No active bleeding.  Superficial lip laceration that does not go through and through.  No intraoral lacerations appreciated.    Neurological:      General: No focal deficit present.      Mental Status: She is alert.      Comments: No focal neuro deficits  CN II-XII grossly intact  Motor 5/5 in UE/LE  Sensory Intact  Normal Cerebellar Function including finger to nose  Gait Normal    Psychiatric:         Mood and Affect: Mood normal.         Behavior: Behavior normal.         Physical Exam                ED Course   Labs Reviewed - No data to display       Results            EKG    Rate, intervals and axes as noted on EKG Report.  Rate: 73  Rhythm: NSR  Reading: NSR. Nl intervals. No acute ST changes                                MDM           Medical Decision Making  54-year-old female with multiple complaints status post fall last night.  Questionable if fall was mechanical.  Differential facial fracture versus intracranial bleed versus concussion  Elevated BP, otherwise vital signs are stable.  Tetanus updated in the clinic.  Multiple facial abrasions with no indication for wound repair.  No gross neurodeficits on exam.    EKG performed in the clinic unremarkable without any significant arrhythmia or ischemic changes.   Due to the questionable mechanism of the fall, the patient's symptoms and the fact that the fall was unwitnessed we will send the patient to the ED for consideration of CT imaging of the head and face and further evaluation and management as needed.  Discussed case with Dr. Singh. Agreeable with management and dispo.     Disposition and Plan     Clinical Impression:  1. Contusion of face, initial encounter    2. Injury of head, initial encounter    3. Abrasion    4. Encounter for immunization         Disposition:  Ic to ed  4/26/2025  4:13 pm    Follow-up:  No follow-up provider specified.        Medications Prescribed:  Discharge  Medication List as of 4/26/2025  4:13 PM          Supplementary Documentation:

## 2025-04-26 NOTE — ED QUICK NOTES
Pt endorses issues with vertigo over the past couple days with the \"feeling of water in my ears\". States last night, + etoh use where she was dizzy and fell onto concrete. + abrasion noted to right eye and above upper lip. Denies LOC, cervical tenderness

## 2025-04-26 NOTE — ED PROVIDER NOTES
Patient Seen in: [facility]         EMERGENCY DEPARTMENT NOTE    Dictated. Voice Transcription software has been utilized for this dictation (the reader should be aware that typographical errors are possible with voice transcription software and to please contact the dictating physician if there are questions.)         History     Chief Complaint   Patient presents with    Facial Injury    Fall       There may be discrepancies from triage note.     HPI    History provided by pt and pt's .   54-year-old female who reports a history of hypertension complaining of right-sided facial pain status post mechanical fall sustained at 12:30 AM.  States that she was walking outside on her concrete patio when her shoe stuck to the ground and she fell forward hitting her face on concrete.  No LOC.  She states that she sustained skin tears just above her right upper lip and right forehead and right lip which she cleaned and closed with skin glue.  Denies headache.  States that she was drinking alcohol yesterday, no drugs.   states that patient is acting normally, ambulating steady.  Patient's primary complaint today is to evaluate for any facial fracture/wound status post this injury.  She mentions that she has had intermittent bilateral ear fullness associated with occasional ringing since end of February after taking a flight.  States that she had clogged ears during this flight and was unable to fully pop her ears.  States that she had success a week thereafter however her symptoms have returned.  She reports having an episode of room spinning like sensation in early March however this was an isolated episode.  Has had no vertigo since then.  She denies dizziness at this time.  Denies anticoagulation/antiplatelet use.     at bedside states that patient is mentating normally.  She has no other complaints  No fevers, chills, nausea, vomiting, diarrhea, constipation, cough, cold symptoms, urinary  complaints.  No chest pain, shortness of breath    No headache, neck pain, neck stiffness, incontinence.  No changes in mentation, no changes in vision, no total/new extremity weakness, no total/new extremity paresthesia, no difficulty speaking.  No alleviating or exacerbating factors.  Denies orthopnea, pnd, change in exercise tolerance limited by chest pain/sob , lower extremity edema/asymmetry.         B easrs since end of feb.   History reviewed. Past Medical History[1]    History reviewed. Past Surgical History[2]      Medications :  Prescriptions Prior to Admission[3]     Family History[4]    Smoking Status: Social Hx on file[5]    Review of Systems   Constitutional: Negative.    HENT: Negative.     Eyes: Negative.    Respiratory: Negative.     Cardiovascular: Negative.    Gastrointestinal: Negative.    Genitourinary: Negative.    Musculoskeletal: Negative.    Skin: Negative.    Neurological: Negative.    Endo/Heme/Allergies: Negative.    Psychiatric/Behavioral: Negative.     All other systems reviewed and are negative.    Pertinent positives as listed.  All other organ systems are reviewed and are negative.    Constitutional and vital signs reviewed.      Social History and Family History elements reviewed from today, pertinent positives to the presenting problem noted.    Physical Exam     ED Triage Vitals [04/26/25 1638]   BP (!) 216/104   Pulse 112   Resp 20   Temp 97.9 °F (36.6 °C)   Temp src Oral   SpO2 95 %   O2 Device None (Room air)       All measures to prevent infection transmission during my interaction with the patient were taken. The patient was already wearing a droplet mask on my arrival to the room. Personal protective equipment including droplet mask, eye protection, and gloves were worn throughout the duration of the exam.  Handwashing was performed prior to and after the exam.  Stethoscope and any equipment used during my examination was cleaned with super sani-cloth germicidal wipes  following the exam.     Physical Exam  Vitals and nursing note reviewed.   Constitutional:       General: She is not in acute distress.     Appearance: She is obese. She is not ill-appearing or toxic-appearing.   HENT:      Head: Normocephalic and atraumatic.      Right Ear: Tympanic membrane normal.      Left Ear: Tympanic membrane normal.      Nose: No congestion or rhinorrhea.      Comments: No septal hematoma     Mouth/Throat:      Pharynx: Oropharynx is clear. No oropharyngeal exudate or posterior oropharyngeal erythema.      Comments: Normal dry range of motion.  No dental fractures.  Closed wound noted just above right upper lip, and right forehead and right lateral upper lip.  No overlying erythema, warmth, tenderness.  No crepitus.  No induration, no fluctuance  Eyes:      General:         Right eye: No discharge.         Left eye: No discharge.      Extraocular Movements: Extraocular movements intact.      Conjunctiva/sclera: Conjunctivae normal.      Pupils: Pupils are equal, round, and reactive to light.   Neck:      Vascular: No carotid bruit.      Comments: No tenderness to cervical spine, no step-offs. normal range of motion of neck    Cardiovascular:      Rate and Rhythm: Normal rate and regular rhythm.      Comments: Radial pulses 2+ bilaterally    Pulmonary:      Effort: Pulmonary effort is normal. No respiratory distress.      Breath sounds: No stridor. No wheezing, rhonchi or rales.   Chest:      Chest wall: No tenderness.   Abdominal:      General: There is no distension.      Palpations: Abdomen is soft.      Tenderness: There is no abdominal tenderness. There is no guarding or rebound.      Comments: Negative Luz sign, negative McBurney's point tenderness     Musculoskeletal:      Cervical back: Normal range of motion and neck supple. No rigidity.      Right lower leg: No edema.      Left lower leg: No edema.      Comments: No spinal tenderness diffusely.  No step-offs.  Normal range of  motion of back.  .  No tenderness to pelvic   Skin:     Capillary Refill: Capillary refill takes less than 2 seconds.   Neurological:      Mental Status: She is alert.      Comments: Cranial nerves 3-12 intact.    5/5 bilateral finger , biceps, triceps, leg extension/flexion, dorsiflexion/plantarflexion.  Sensory function intact symmetrically bilaterally to face, upper extremities and lower extremities to soft touch.  Normal finger-to-nose.  Steady gait  Negative pronator drift       Psychiatric:         Mood and Affect: Mood normal.         Behavior: Behavior normal.           Review of prior notes in Care everywhere/Epic performed by myself:  Patient was seen in urgent care earlier today.  Was sent over to the emergency department given trauma.    Chest x-ray in 2022 negative for acute pathology    ED Course     If labs obtained, they are personally reviewed by myself:     Labs Reviewed   BASIC METABOLIC PANEL (8) - Normal   TROPONIN I HIGH SENSITIVITY - Normal   MAGNESIUM - Normal   CBC WITH DIFFERENTIAL WITH PLATELET       If radiologic studies ordered during today's ER visit, my independent interpretation are seen directly below.  This is awaiting the radiologist's final interpretation.  CT brain, independent interpretation completed by myself, awaiting  formal radiology read: No obvious intracranial hemorrhage.  No obvious facial fractures  CTA brain neck , independent interpretation of radiologic study completed by myself and awaiting formal radiologist interpretation: No obvious intracranial bleed      Imaging Results read by radiology in ED: CTA BRAIN + CTA CAROTIDS (CPT=70496/89262)  Addendum Date: 4/26/2025  ADDENDUM:  No acute fracture.   Dictated by (CST): Basim Li MD on 4/26/2025 at 7:38 PM     Finalized by (CST): Basim Li MD on 4/26/2025 at 7:39 PM             Result Date: 4/26/2025  CONCLUSION:  1. No major vessel occlusion, hemodynamically significant stenosis, dissection, AVM or  aneurysm. 2. Atherosclerotic calcification cavernous carotid arteries without significant stenosis. 3. No acute intracranial finding. 4. Enlarged main pulmonary artery suggests pulmonary hypertension.  If not previously evaluated, follow-up nonemergent cardiac echo recommended.     Dictated by (CST): Basim Li MD on 4/26/2025 at 6:53 PM     Finalized by (CST): Basim Li MD on 4/26/2025 at 7:01 PM                ED Medications Administered:   Medications   sodium chloride 0.9 % IV bolus 1,000 mL (1,000 mL Intravenous New Bag 4/26/25 1818)   iopamidol 76% (ISOVUE-370) injection for power injector (65 mL Intravenous Given 4/26/25 1844)           Vitals:    04/26/25 1638 04/26/25 1700 04/26/25 1800   BP: (!) 216/104 (!) 182/97 (!) 178/91   Pulse: 112 66 67   Resp: 20 20 20   Temp: 97.9 °F (36.6 °C)     TempSrc: Oral     SpO2: 95% 95% 95%   Weight: 99.8 kg     Height: 167.6 cm (5' 6\")       *I personally reviewed and interpreted all ED vitals.    Pulse Ox interpretation by myself: 95%, Room air, Normal     Monitor Interpretation by myself:   normal sinus rhythm    If Ekg obtained during today's visit, it is independently interpreted by myself directly below:      Medical Record Review: I personally reviewed available prior medical records for any recent pertinent discharge summaries, testing, and procedures and reviewed those reports.      [facility]     Medical decision making/ED Course:   54-year-old female complaining of facial pain status post a mechanical fall.  States that she remembers how her fall occurred and her shoes stuck to the ground causing the fall.  Reports having intermittent ear fullness and ringing after a flight in late February/early March.  Had 1 isolated episode of dizziness which self resolved.  Currently denies vertigo.  Neurologically and vascularly intact.  Has facial wounds which she used liquid glue to close herself.  No signs of infection to these wounds.  Patient well-appearing.  Her  symptoms seem consistent with a mechanical fall.  Thankfully CT brain/CTA brain/neck negative for acute pathology.  I personally spoke to CT technician requesting radiologist to evaluate for facial fractures given location of imaging and no facial fractures noted.    Given her ear symptoms, isolated episode of dizziness, basic labs and cardiac labs obtained.  Thankfully lab work today is stable/normal.  CBC normal, magnesium normal, troponin/BNP normal.  EKG nonischemic at urgent care.  She kindly declined EKG here in the emergency department.  Monitor revealing sinus rhythm.  I personally reviewed EKG at urgent care revealing normal sinus rhythm.    Given her intermittent ear fullness and tinnitus, I have requested her to follow with ENT as an outpatient.  Recommendation provided.  Strict return precautions given  CT scan negative for CVA      Patient states that she does not have an alcohol abuse problem.  No signs of alcohol withdrawal/delirium tremens.  ACS, dissection, sinus venous thrombus, CVA, meningitis, cardiac dysrhythmia, infectious pathology, among other life-threatening medical conditions considered and seems unlikely given patient's history, exam, and appearance.  Traumatic CVA, cervical spine fracture, spine fracture, traumatic chest injury (i.e. pneumothorax, cardiac contusion, dissection etc), organ abdominal injury (bowel perforation, organ perforation), among other life-threatening medical conditions considered and seems unlikely given patient's history, exam, and appearance.    Strict return instructions given.  Patient encouraged to follow-up with primary care provider in the next few days.  Advised to return to the emergency department for any worsening symptoms    Patient is non toxic appearing, is in no distress, hemodynamically stable.  Pt agrees and is aware of plan.       Differential Diagnosis:  as listed above in medical decision making.   *Please note that in the presenting to the  emergency department, illness/injury that poses a threat to life or function is considered during this patient's initial evaluation.    The complexity of this visit is therefore inherently more complex given the need to consider life threatening pathology prior to any other etiology for this patient's visit.    The differential diagnosis and medical decision above exemplify this rationale.       Medical Decision Making  Problems Addressed:  Fall, initial encounter: acute illness or injury  Tinnitus, unspecified laterality: acute illness or injury    Amount and/or Complexity of Data Reviewed  Independent Historian: spouse  External Data Reviewed: notes.  Labs: ordered. Decision-making details documented in ED Course.  Radiology: ordered and independent interpretation performed. Decision-making details documented in ED Course.  ECG/medicine tests: ordered and independent interpretation performed. Decision-making details documented in ED Course.               Vitals:    04/26/25 1638 04/26/25 1700 04/26/25 1800   BP: (!) 216/104 (!) 182/97 (!) 178/91   Pulse: 112 66 67   Resp: 20 20 20   Temp: 97.9 °F (36.6 °C)     TempSrc: Oral     SpO2: 95% 95% 95%   Weight: 99.8 kg     Height: 167.6 cm (5' 6\")               Complicating Factors: Significant medical problems that contribute to the complexity of this emergency room evaluation is listed above.    Condition upon leaving the department: Stable    Disposition and Plan     Clinical Impression:  1. Fall, initial encounter    2. Tinnitus, unspecified laterality        Disposition:  Discharge    Medications Prescribed:  Current Discharge Medication List          I have discussed the discharge plan with the patient and/or family or well wisher present in the room with the patient's permission.  They state that they understand and agree with the plan.  All questions regarding their care have been answered prior to discharge.  They are aware: Emergency Department is not intended  to be a substitute for an effort to provide complete medical care. The imaging, if any, have often been interpreted on a preliminary basis pending final reading by the radiologist.  Instructed to return immediately to the ED if any changes or worsening of condition should occur.  If patient's blood pressure was greater than 140/90 today, patient encouraged to have this blood pressure rechecked with primary MD and blood pressure education provided.                       [1]   Past Medical History:   Acid reflux    Allergic rhinitis    Arrhythmia    Disorder of thyroid    Esophageal reflux    High blood pressure    Hypothyroidism    Missed  (HCC)    Missed  @ ~12weeks: D&C    Obesity, unspecified    Unspecified essential hypertension   [2]   Past Surgical History:  Procedure Laterality Date    Colonoscopy N/A 2023    Procedure: COLONOSCOPY;  Surgeon: Pau Dumont MD;  Location: [facility] ENDOSCOPY    D & c  2008    Hysterectomy  2019    Partial    Hysteroscopy      Other surgical history      LSC   [3] (Not in a hospital admission)   [4]   Family History  Problem Relation Age of Onset    Hypertension Father     Diabetes Paternal Grandmother     Breast Cancer Neg     Ovarian Cancer Neg    [5]   Social History  Socioeconomic History    Marital status:    Occupational History    Occupation: Facility food R[employer]   Tobacco Use    Smoking status: Former     Current packs/day: 0.00     Average packs/day: 0.4 packs/day for 20.0 years (8.0 ttl pk-yrs)     Types: Cigarettes     Start date: 8/3/2000     Quit date: 8/3/2020     Years since quittin.7     Passive exposure: Current    Smokeless tobacco: Never    Tobacco comments:     Social smoker   Vaping Use    Vaping status: Never Used   Substance and Sexual Activity    Alcohol use: Yes     Alcohol/week: 6.0 standard drinks of alcohol     Types: 6 Standard drinks or equivalent per week    Drug use: No    Sexual activity: Yes   Other Topics  Concern    Caffeine Concern Yes     Comment: Coffee 1 cup; Tea 3-4 cups a week;     Exercise No

## 2025-04-26 NOTE — ED INITIAL ASSESSMENT (HPI)
Pt has had ringing in her ears for over 3 weeks, felt lightheaded last night, loss her balance and fell forward on concrete, no loc,c/o abrasion  to r side of face, contusion to nose with h/o epistaxis, no neck  pain, no headache, minimal left hip pain, pt ambulatory

## 2025-04-27 LAB
ATRIAL RATE: 73 BPM
P AXIS: 34 DEGREES
P-R INTERVAL: 160 MS
Q-T INTERVAL: 414 MS
QRS DURATION: 88 MS
QTC CALCULATION (BEZET): 456 MS
R AXIS: 6 DEGREES
T AXIS: 23 DEGREES
VENTRICULAR RATE: 73 BPM

## 2025-04-27 NOTE — DISCHARGE INSTRUCTIONS
please follow with the ENT as an outpatient.    You closed your wounds with liquid bandage.  Return to ER for redness at the site, fevers of 100.4, fluid wave shift, any worsening symptoms.  Try not to lick your lips  Please return to the emergency room for any worsening symptoms including but not limited to: Weakness, numbness, losing stool/urine on yourself, headache, vision changes, changes in mentation, neck stiffness, chest pain, shortness of breath, leg swelling, etc.  Please follow-up with your primary care provider in the next few days for reevaluation.    The Emergency Department is not intended to be a substitute for an effort to provide complete medical care. The imaging, if any, have often been interpreted on a preliminary basis pending final reading by the radiologist. If your blood pressure was greater than 140/90, please have this blood pressure rechecked by your primary care provider in the next several days.

## 2025-04-30 ENCOUNTER — TELEPHONE (OUTPATIENT)
Dept: OTOLARYNGOLOGY | Facility: CLINIC | Age: 55
End: 2025-04-30

## 2025-04-30 NOTE — TELEPHONE ENCOUNTER
Pt scheduled to see Dr. Real on Monday 05/05, pt agreeable no further questions or concerns. RN notified Dr. Real.

## 2025-05-05 ENCOUNTER — OFFICE VISIT (OUTPATIENT)
Dept: OTOLARYNGOLOGY | Facility: CLINIC | Age: 55
End: 2025-05-05

## 2025-05-05 ENCOUNTER — OFFICE VISIT (OUTPATIENT)
Dept: AUDIOLOGY | Facility: CLINIC | Age: 55
End: 2025-05-05

## 2025-05-05 DIAGNOSIS — R42 DIZZINESS: Primary | ICD-10-CM

## 2025-05-05 DIAGNOSIS — H69.93 DYSFUNCTION OF BOTH EUSTACHIAN TUBES: ICD-10-CM

## 2025-05-05 DIAGNOSIS — H93.8X3 SENSATION OF FULLNESS IN BOTH EARS: ICD-10-CM

## 2025-05-05 PROCEDURE — 92567 TYMPANOMETRY: CPT | Performed by: AUDIOLOGIST

## 2025-05-05 PROCEDURE — 92557 COMPREHENSIVE HEARING TEST: CPT | Performed by: AUDIOLOGIST

## 2025-05-05 RX ORDER — PREDNISONE 5 MG/1
TABLET ORAL
Qty: 35 TABLET | Refills: 0 | Status: SHIPPED | OUTPATIENT
Start: 2025-05-05 | End: 2025-05-20

## 2025-05-05 RX ORDER — FLUTICASONE PROPIONATE 50 MCG
1 SPRAY, SUSPENSION (ML) NASAL 2 TIMES DAILY
Qty: 16 G | Refills: 3 | Status: SHIPPED | OUTPATIENT
Start: 2025-05-05

## 2025-05-06 NOTE — PROGRESS NOTES
[facility]  OTOLARYNGOLOGY - HEAD & NECK SURGERY    5/5/2025     Reason for Consultation:   Bilateral ear fullness, dizziness    History of Present Illness:   Patient is a pleasant 54 year old female who is being seen for bilateral ear fullness and dizziness.  The patient states that ever since coming back from a trip she has had issues with her ears.  She has a sensation that she has fullness in her ears with an inability to \"pop\" her ears with auto insufflation.  The patient states that she can sometimes get air to go into her middle ear space but this does not give her any relief.  She has not had any otorrhea.  Denies any severe ear pain.  She notices this issue on both sides.  She did have CTA neck and head which did not reveal any etiology of her symptoms.  She has not had any prior recurring ear issues.  No previous ear surgery.  She does also notice that she has dizziness where she has difficulty walking down the stairs sometimes.  She did have approximately 24 hours of vertigo.  Has not had any history of migraine headaches.    Past Medical History  Past Medical History[1]    Past Surgical History  Past Surgical History[2]    Family History  Family History[3]    Social History  Pediatric History   Patient Parents    Not on file     Other Topics Concern     Service Not Asked    Blood Transfusions Not Asked    Caffeine Concern Yes     Comment: Coffee 1 cup; Tea 3-4 cups a week;     Occupational Exposure Not Asked    Hobby Hazards Not Asked    Sleep Concern Not Asked    Stress Concern Not Asked    Weight Concern Not Asked    Special Diet Not Asked    Back Care Not Asked    Exercise No    Bike Helmet Not Asked    Seat Belt Not Asked    Self-Exams Not Asked   Social History Narrative    Not on file           Current Medications:  Current Medications[4]    Allergies  Allergies[5]    Review of Systems:   A comprehensive 10 point review of systems was completed.  Pertinent positives and negatives noted in the  the HPI.    Physical Exam:   Last menstrual period 06/01/2019, not currently breastfeeding.    GENERAL: No acute distress, Comfortable appearing  FACE: HB 1/6, Normal Animation  HEAD: Normocephalic  EYES: EOMI, pupils equil  EARS: Bilateral Auricles Symmetric, bilateral tympanic membranes appear normal  NOSE: Nares patent bilaterally  ORAL CAVITY: Tongue mobile, Oropharynx clear, Floor of mouth clear, Posterior oropharynx normal  NECK: No palpable lymphadenopathy, thyroid not palpable, nontender    Canals:  Right: Clear  Left: Clear    Tympanic Membranes:  Right: Normal tympanic membrane, with no retraction, middle ear space clear  Left: Normal tympanic membrane. with no retraction middle ear space clear    TM Visualized Method:   Right TM examined via otomicroscopy.   Left TM examined via otomicroscopy.        Results:     Laboratory Data:  Lab Results   Component Value Date    WBC 9.2 04/26/2025    HGB 15.3 04/26/2025    HCT 44.0 04/26/2025    .0 04/26/2025    CREATSERUM 0.79 04/26/2025    BUN 13 04/26/2025     04/26/2025    K 4.7 04/26/2025     04/26/2025    CO2 22.0 04/26/2025    GLU 92 04/26/2025    CA 9.0 04/26/2025    ALB 4.8 02/13/2025    ALKPHO 70 02/13/2025    TP 7.9 02/13/2025    AST 21 02/13/2025    ALT 20 02/13/2025    T4F 2.0 (H) 02/13/2025    TSH 0.189 (L) 02/13/2025    MG 1.8 04/26/2025    B12 513 03/12/2021         Imaging:  CTA BRAIN + CTA CAROTIDS (CPT=70496/56242)  Addendum Date: 4/26/2025  ADDENDUM:  No acute fracture.   Dictated by (CST): Basim Li MD on 4/26/2025 at 7:38 PM     Finalized by (CST): Basim Li MD on 4/26/2025 at 7:39 PM             Result Date: 4/26/2025  PROCEDURE: CTA BRAIN + CTA CAROTIDS (CPT=70496/61133)  COMPARISON: [facility], CT BRAIN HEAD WO CONTRAST, 12/03/2013, 3:14 AM.  INDICATIONS: Acute dizziness and Tinnitus.  TECHNIQUE:   CT images of the neck and brain were obtained with non-ionic intravenous contrast material.  Multi-planar reformatted/3-D images were created to optimize visualization of vascular anatomy. Automated exposure control for dose reduction was used. Dose information is transmitted to the ACR (American College of Radiology) NRDR (National Radiology Data Registry) which includes the Dose Index Registry.Evaluation of internal carotid stenosis is based on NASCET Criteria.   FINDINGS: Atherosclerotic calcification cavernous carotid arteries without significant stenosis. CAROTID ARTERIES: RIGHT COMMON CAROTID: No hemodynamically significant stenosis or dissection.  RIGHT INTERNAL CAROTID: No hemodynamically significant stenosis or dissection.   LEFT COMMON CAROTID: No hemodynamically significant stenosis or dissection.  LEFT INTERNAL CAROTID: No hemodynamically significant stenosis or dissection.   VERTEBRAL ARTERIES: RIGHT: No hemodynamically significant stenosis or dissection.  LEFT: No hemodynamically significant stenosis or dissection.   BASILAR ARTERY: No hemodynamically significant stenosis or dissection.     AORTIC ARCH (Limited): No aneurysm or dissection.  MEDIASTINUM/APICES (Limited): Enlarged main pulmonary artery with transverse dimension 3.9 cm.  No mass or adenopathy.   OTHER: No suspicious neck mass or lymphadenopathy.  Degenerative changes in spine.     INTRACRANIAL ARTERIES: ANTERIOR CEREBRALS:  No significant stenosis.  No visible aneurysm or vascular malformation.  MIDDLE CEREBRALS: No significant stenosis.  No visible aneurysm or vascular malformation. POSTERIOR CEREBRALS: No significant stenosis.  No visible aneurysm or vascular malformation.  OTHER FINDINGS: No edema, hemorrhage, mass or acute infarct.         CONCLUSION:  1. No major vessel occlusion, hemodynamically significant stenosis, dissection, AVM or aneurysm. 2. Atherosclerotic calcification cavernous carotid arteries without significant stenosis. 3. No acute intracranial finding. 4. Enlarged main pulmonary artery suggests pulmonary  hypertension.  If not previously evaluated, follow-up nonemergent cardiac echo recommended.     Dictated by (CST): Basim Li MD on 4/26/2025 at 6:53 PM     Finalized by (CST): Basim Li MD on 4/26/2025 at 7:01 PM          Latest Audiogram Result (Hz) Exam performed: 5/5/2025 3:51 PM Last edited by Charlene Stanford MS, CCC-A on 5/5/2025 4:00 PM        125 250  1500 2000 3000 4000 6000 8000    Right air:  20 15  20 10 5  10  15    Left air:  20 20  20 10 5  15  15       Reliability:  Good    Transducer:  Inserts    Technique:  Conventional Audiometry    Comments:            Latest Speech Audiometry  Last edited by Charlene Stanford MS, CCC-A on 5/5/2025 4:00 PM       Ear Method PTA SAT SRT [facility] Test/list Score (%) Intensity Mask/noise Notes    right live voice   5   10 By Difficulty 100 55      left live voice   5   10 By Difficulty 100 55                    Latest Tympanogram Result       Probe Tone (Hz): 226 Exam performed: 5/5/2025 3:51 PM Last edited by Charlene Stanford MS, CCC-A on 5/5/2025 4:00 PM      Tympanograms  These were drawn by a user, not generated from device data      Right Ear Left Ear                     Right Ear Left Ear    Tympanogram type: Type A Type A    Canal volume (mL): 2 2    Peak pressure (daPa): 0 0    Peak amplitude (mL): 0.9 0.7    Tympanogram width (daPa):        Comments:                    Latest Audiogram and Tympanogram Result Text  Last edited by Charlene Stanford MS, CCC-A on 5/5/2025  4:00 PM      Study Result                 Narrative & Impression  Patient reports dizziness.    Hearing is -8000 Hz bilaterally.    Normal tymps.    F/u with Dr. Real today.                   Impression:       ICD-10-CM    1. Dizziness  R42 Audiology Referral - [place] ([place] [facility])      2. Dysfunction of both eustachian tubes  H69.93       3. Sensation of fullness in both ears  H93.8X3            Recommendations:  Patient's audiogram today is within normal limits  including her tympanograms.  Is possible that she still is having some intermittent eustachian tube dysfunction causing her symptoms.  She did have some improvement when she was taking Mucinex D.  I would like her to start Flonase nasal spray and start a prednisone taper to see if this helps alleviate her symptoms.  If she continues to be symptomatic she will return to see me in the office when she returns from her upcoming trip to [place].    Thank you for allowing me to participate in the care of your patient.    Nick Real,    Otolaryngology/Rhinology, Sinus, and Endoscopic Skull Base Surgery  [facility]   [address] [place] [id number]  Phone [phone]  Fax [phone]  2025  10:25 PM  2025          [1]   Past Medical History:   Acid reflux    Allergic rhinitis    Arrhythmia    Disorder of thyroid    Esophageal reflux    High blood pressure    Hypothyroidism    Missed  (HCC)    Missed  @ ~12weeks: D&C    Obesity, unspecified    Unspecified essential hypertension   [2]   Past Surgical History:  Procedure Laterality Date    Colonoscopy N/A 2023    Procedure: COLONOSCOPY;  Surgeon: Pau Dumont MD;  Location: [facility] ENDOSCOPY    D & c  2008    Hysterectomy  2019    Partial    Hysteroscopy      Other surgical history      LSC   [3]   Family History  Problem Relation Age of Onset    Hypertension Father     Diabetes Paternal Grandmother     Breast Cancer Neg     Ovarian Cancer Neg    [4]   Current Outpatient Medications   Medication Sig Dispense Refill    predniSONE 5 MG Oral Tab Take 4 tablets (20 mg total) by mouth daily for 5 days, THEN 2 tablets (10 mg total) daily for 5 days, THEN 1 tablet (5 mg total) daily for 5 days. 35 tablet 0    fluticasone propionate 50 MCG/ACT Nasal Suspension 1 spray by Nasal route 2 (two) times daily. 16 g 3    acyclovir 5 % External Ointment Apply 1 Application  topically as needed. 30 g 1     losartan 50 MG Oral Tab Take 1 tablet (50 mg total) by mouth daily. 90 tablet 3    LIOTHYRONINE 5 MCG Oral Tab TAKE 1 TABLET(5 MCG) BY MOUTH DAILY 90 tablet 3    LEVOTHYROXINE 150 MCG Oral Tab TAKE 1 TABLET(150 MCG) BY MOUTH BEFORE BREAKFAST 90 tablet 3    cetirizine 10 MG Oral Tab Take 1 tablet (10 mg total) by mouth daily as needed for Allergies.      Multiple Vitamins-Minerals (MULTIVITAMIN ADULT, MINERALS,) Oral Tab Take 1 tablet by mouth daily.      omeprazole (PRILOSEC) 20 MG Oral Capsule Delayed Release Take 1 capsule by mouth every morning. 90 capsule 3   [5]   Allergies  Allergen Reactions    Amlodipine SWELLING     amlodipine

## 2025-07-28 DIAGNOSIS — E03.9 HYPOTHYROIDISM, UNSPECIFIED TYPE: ICD-10-CM

## 2025-07-30 RX ORDER — LEVOTHYROXINE SODIUM 150 UG/1
150 TABLET ORAL
Qty: 90 TABLET | Refills: 0 | Status: SHIPPED | OUTPATIENT
Start: 2025-07-30

## 2025-07-30 RX ORDER — LIOTHYRONINE SODIUM 5 UG/1
5 TABLET ORAL DAILY
Qty: 90 TABLET | Refills: 0 | Status: SHIPPED | OUTPATIENT
Start: 2025-07-30

## (undated) DIAGNOSIS — I10 ESSENTIAL HYPERTENSION: ICD-10-CM

## (undated) DIAGNOSIS — E03.9 HYPOTHYROIDISM, UNSPECIFIED TYPE: Primary | ICD-10-CM

## (undated) DEVICE — Device: Brand: DUAL NARE NASAL CANNULAE FEMALE LUER CON 7FT O2 TUBE

## (undated) DEVICE — 60 ML SYRINGE REGULAR TIP: Brand: MONOJECT

## (undated) DEVICE — KIT ENDO ORCAPOD 160/180/190

## (undated) DEVICE — MEDI-VAC NON-CONDUCTIVE SUCTION TUBING 6MM X 1.8M (6FT.) L: Brand: CARDINAL HEALTH

## (undated) DEVICE — KIT CLEAN ENDOKIT 1.1OZ GOWNX2

## (undated) NOTE — LETTER
[address]  [facility] [address]  Dept: [phone]  Dept Fax: [phone]: [phone]      September 5, 2017    Patient: Sanjeev Ibrahim   Date of Visit: 9/5/2017       To Whom It May Concern:    Be

## (undated) NOTE — LETTER
[address]  Authorization for Surgical Operation and Procedure                                                                                           I hereby authorize Mony Ho MD, my physician and his/her assistants (if applicable), which may include medical students, residents, and/or fellows, to perform the following surgical operation/ procedure and administer such anesthesia as may be determined necessary by my physician: Operation/Procedure name (s) COLONOSCOPY on [address]   2. I recognize that during the surgical operation/procedure, unforeseen conditions may necessitate additional or different procedures than those listed above. I, therefore, further authorize and request that the above-named surgeon, assistants, or designees perform such procedures as are, in their judgment, necessary and desirable. 3.   My surgeon/physician has discussed prior to my surgery the potential benefits, risks and side effects of this procedure; the likelihood of achieving goals; and potential problems that might occur during recuperation. They also discussed reasonable alternatives to the procedure, including risks, benefits, and side effects related to the alternatives and risks related to not receiving this procedure. I have had all my questions answered and I acknowledge that no guarantee has been made as to the result that may be obtained. 4.   Should the need arise during my operation/procedure, which includes change of level of care prior to discharge, I also consent to the administration of blood and/or blood products. Further, I understand that despite careful testing and screening of blood or blood products by collecting agencies, I may still be subject to ill effects as a result of receiving a blood transfusion and/or blood products.   The following are some, but not all, of the potential risks that can occur: fever and allergic reactions, hemolytic reactions, transmission of diseases such as Hepatitis, AIDS and Cytomegalovirus (CMV) and fluid overload. In the event that I wish to have an autologous transfusion of my own blood, or a directed donor transfusion, I will discuss this with my physician. Check only if Refusing Blood or Blood Products  I understand refusal of blood or blood products as deemed necessary by my physician may have serious consequences to my condition to include possible death. I hereby assume responsibility for my refusal and release the hospital, its personnel, and my physicians from any responsibility for the consequences of my refusal.    o  Refuse   5. I authorize the use of any specimen, organs, tissues, body parts or foreign objects that may be removed from my body during the operation/procedure for diagnosis, research or teaching purposes and their subsequent disposal by hospital authorities. I also authorize the release of specimen test results and/or written reports to my treating physician on the hospital medical staff or other referring or consulting physicians involved in my care, at the discretion of the Pathologist or my treating physician. 6.   I consent to the photographing or videotaping of the operations or procedures to be performed, including appropriate portions of my body for medical, scientific, or educational purposes, provided my identity is not revealed by the pictures or by descriptive texts accompanying them. If the procedure has been photographed/videotaped, the surgeon will obtain the original picture, image, videotape or CD. The hospital will not be responsible for storage, release or maintenance of the picture, image, tape or CD.    7.   I consent to the presence of a  or observers in the operating room as deemed necessary by my physician or their designees.     8.   I recognize that in the event my procedure results in extended X-Ray/fluoroscopy time, I may develop a skin reaction. 9. If I have a Do Not Attempt Resuscitation (DNAR) order in place, that status will be suspended while in the operating room, procedural suite, and during the recovery period unless otherwise explicitly stated by me (or a person authorized to consent on my behalf). The surgeon or my attending physician will determine when the applicable recovery period ends for purposes of reinstating the DNAR order. 10. Patients having a sterilization procedure: I understand that if the procedure is successful the results will be permanent and it will therefore be impossible for me to inseminate, conceive, or bear children. I also understand that the procedure is intended to result in sterility, although the result has not been guaranteed. 11. I acknowledge that my physician has explained sedation/analgesia administration to me including the risk and benefits I consent to the administration of sedation/analgesia as may be necessary or desirable in the judgment of my physician. I CERTIFY THAT I HAVE READ AND FULLY UNDERSTAND THE ABOVE CONSENT TO OPERATION and/or OTHER PROCEDURE.     _________________________________________ _________________________________     ___________________________________  Signature of Patient     Signature of Responsible Person                   Printed Name of Responsible Person                              _________________________________________ ______________________________        ___________________________________  Signature of Witness         Date  Time         Relationship to Patient    STATEMENT OF PHYSICIAN My signature below affirms that prior to the time of the procedure; I have explained to the patient and/or his/her legal representative, the risks and benefits involved in the proposed treatment and any reasonable alternative to the proposed treatment.  I have also explained the risks and benefits involved in refusal of the proposed treatment and alternatives to the proposed treatment and have answered the patient's questions.  If I have a significant financial interest in a co-management agreement or a significant financial interest in any product or implant, or other significant relationship used in this procedure/surgery, I have disclosed this and had a discussion with my patient.     _______________________________________________________________ _____________________________  (Signature of Physician)                                                                                         (Date)                                   (Time)  Patient Name: Shawna Giles    : 11/3/1970   Printed: 2023      Medical Record #: [id number]                                              Page 1 of 1

## (undated) NOTE — LETTER
[address]  [facility] [address]  Dept: [phone]  Dept Fax: [phone]: [phone]      September 5, 2017    Patient: Mac Lamb   Date of Visit: 9/5/2017       To Whom It May Concern:    Be